# Patient Record
Sex: FEMALE | Race: WHITE | NOT HISPANIC OR LATINO
[De-identification: names, ages, dates, MRNs, and addresses within clinical notes are randomized per-mention and may not be internally consistent; named-entity substitution may affect disease eponyms.]

---

## 2021-06-22 ENCOUNTER — NON-APPOINTMENT (OUTPATIENT)
Age: 36
End: 2021-06-22

## 2021-06-22 PROBLEM — Z00.00 ENCOUNTER FOR PREVENTIVE HEALTH EXAMINATION: Status: ACTIVE | Noted: 2021-06-22

## 2021-06-23 ENCOUNTER — NON-APPOINTMENT (OUTPATIENT)
Age: 36
End: 2021-06-23

## 2021-06-23 ENCOUNTER — OUTPATIENT (OUTPATIENT)
Dept: OUTPATIENT SERVICES | Facility: HOSPITAL | Age: 36
LOS: 1 days | Discharge: HOME | End: 2021-06-23

## 2021-06-23 ENCOUNTER — ASOB RESULT (OUTPATIENT)
Age: 36
End: 2021-06-23

## 2021-06-23 ENCOUNTER — APPOINTMENT (OUTPATIENT)
Dept: ANTEPARTUM | Facility: CLINIC | Age: 36
End: 2021-06-23
Payer: MEDICAID

## 2021-06-23 ENCOUNTER — RESULT CHARGE (OUTPATIENT)
Age: 36
End: 2021-06-23

## 2021-06-23 ENCOUNTER — APPOINTMENT (OUTPATIENT)
Dept: OBGYN | Facility: CLINIC | Age: 36
End: 2021-06-23
Payer: MEDICAID

## 2021-06-23 VITALS
SYSTOLIC BLOOD PRESSURE: 90 MMHG | WEIGHT: 147 LBS | DIASTOLIC BLOOD PRESSURE: 60 MMHG | BODY MASS INDEX: 26.05 KG/M2 | HEIGHT: 62.99 IN

## 2021-06-23 LAB
BILIRUB UR QL STRIP: NEGATIVE
CLARITY UR: CLEAR
COLLECTION METHOD: NORMAL
GLUCOSE UR-MCNC: NEGATIVE
HCG UR QL: NEGATIVE EU/DL
HGB UR QL STRIP.AUTO: NEGATIVE
KETONES UR-MCNC: NEGATIVE
LEUKOCYTE ESTERASE UR QL STRIP: NEGATIVE
NITRITE UR QL STRIP: NEGATIVE
PH UR STRIP: 6
PROT UR STRIP-MCNC: NEGATIVE
SP GR UR STRIP: 1.02

## 2021-06-23 PROCEDURE — 99204 OFFICE O/P NEW MOD 45 MIN: CPT

## 2021-06-23 PROCEDURE — 76819 FETAL BIOPHYS PROFIL W/O NST: CPT | Mod: 26

## 2021-06-23 PROCEDURE — 76805 OB US >/= 14 WKS SNGL FETUS: CPT | Mod: 26

## 2021-06-24 DIAGNOSIS — O35.9XX1 MATERNAL CARE FOR (SUSPECTED) FETAL ABNORMALITY AND DAMAGE, UNSPECIFIED, FETUS 1: ICD-10-CM

## 2021-06-24 DIAGNOSIS — O09.30 SUPERVISION OF PREGNANCY WITH INSUFFICIENT ANTENATAL CARE, UNSPECIFIED TRIMESTER: ICD-10-CM

## 2021-06-24 DIAGNOSIS — Z36.89 ENCOUNTER FOR OTHER SPECIFIED ANTENATAL SCREENING: ICD-10-CM

## 2021-06-24 DIAGNOSIS — Z3A.35 35 WEEKS GESTATION OF PREGNANCY: ICD-10-CM

## 2021-06-24 DIAGNOSIS — Z36.3 ENCOUNTER FOR ANTENATAL SCREENING FOR MALFORMATIONS: ICD-10-CM

## 2021-06-24 LAB
C TRACH RRNA SPEC QL NAA+PROBE: NOT DETECTED
N GONORRHOEA RRNA SPEC QL NAA+PROBE: NOT DETECTED
SOURCE AMPLIFICATION: NORMAL

## 2021-06-25 DIAGNOSIS — O35.9XX1 MATERNAL CARE FOR (SUSPECTED) FETAL ABNORMALITY AND DAMAGE, UNSPECIFIED, FETUS 1: ICD-10-CM

## 2021-06-25 DIAGNOSIS — O09.30 SUPERVISION OF PREGNANCY WITH INSUFFICIENT ANTENATAL CARE, UNSPECIFIED TRIMESTER: ICD-10-CM

## 2021-06-25 DIAGNOSIS — Z36.89 ENCOUNTER FOR OTHER SPECIFIED ANTENATAL SCREENING: ICD-10-CM

## 2021-06-25 DIAGNOSIS — Z02.9 ENCOUNTER FOR ADMINISTRATIVE EXAMINATIONS, UNSPECIFIED: ICD-10-CM

## 2021-06-25 DIAGNOSIS — Z34.90 ENCOUNTER FOR SUPERVISION OF NORMAL PREGNANCY, UNSPECIFIED, UNSPECIFIED TRIMESTER: ICD-10-CM

## 2021-06-25 DIAGNOSIS — Z3A.35 35 WEEKS GESTATION OF PREGNANCY: ICD-10-CM

## 2021-06-25 DIAGNOSIS — Z36.3 ENCOUNTER FOR ANTENATAL SCREENING FOR MALFORMATIONS: ICD-10-CM

## 2021-06-25 LAB
ABO + RH PNL BLD: NORMAL
BASOPHILS # BLD AUTO: 0.04 K/UL
BASOPHILS NFR BLD AUTO: 0.4 %
BLD GP AB SCN SERPL QL: NORMAL
EOSINOPHIL # BLD AUTO: 0.17 K/UL
EOSINOPHIL NFR BLD AUTO: 1.7 %
GP B STREP DNA SPEC QL NAA+PROBE: NORMAL
GP B STREP DNA SPEC QL NAA+PROBE: NOT DETECTED
HBV SURFACE AG SER QL: NONREACTIVE
HCT VFR BLD CALC: 37.2 %
HGB A MFR BLD: 97.3 %
HGB A2 MFR BLD: 2.7 %
HGB BLD-MCNC: 12.3 G/DL
HGB FRACT BLD-IMP: NORMAL
HIV1+2 AB SPEC QL IA.RAPID: NONREACTIVE
IMM GRANULOCYTES NFR BLD AUTO: 1.3 %
LYMPHOCYTES # BLD AUTO: 1.44 K/UL
LYMPHOCYTES NFR BLD AUTO: 14.3 %
MAN DIFF?: NORMAL
MCHC RBC-ENTMCNC: 28.8 PG
MCHC RBC-ENTMCNC: 33.1 G/DL
MCV RBC AUTO: 87.1 FL
MEV IGG FLD QL IA: 19.5 AU/ML
MEV IGG+IGM SER-IMP: POSITIVE
MONOCYTES # BLD AUTO: 0.71 K/UL
MONOCYTES NFR BLD AUTO: 7 %
NEUTROPHILS # BLD AUTO: 7.6 K/UL
NEUTROPHILS NFR BLD AUTO: 75.3 %
PLATELET # BLD AUTO: 209 K/UL
RBC # BLD: 4.27 M/UL
RBC # FLD: 13.8 %
RUBV IGG FLD-ACNC: 18 INDEX
RUBV IGG SER-IMP: POSITIVE
SOURCE GBS: NORMAL
T PALLIDUM AB SER QL IA: NEGATIVE
WBC # FLD AUTO: 10.09 K/UL

## 2021-06-26 LAB
BACTERIA UR CULT: NORMAL
HPV HIGH+LOW RISK DNA PNL CVX: NOT DETECTED
LEAD BLD-MCNC: 1 UG/DL
VZV AB TITR SER: POSITIVE
VZV IGG SER IF-ACNC: 185.8 INDEX

## 2021-06-29 LAB
AR GENE MUT ANL BLD/T: NORMAL
M TB IFN-G BLD-IMP: NEGATIVE
QUANTIFERON TB PLUS MITOGEN MINUS NIL: 3.84 IU/ML
QUANTIFERON TB PLUS NIL: 0.02 IU/ML
QUANTIFERON TB PLUS TB1 MINUS NIL: 0 IU/ML
QUANTIFERON TB PLUS TB2 MINUS NIL: 0 IU/ML

## 2021-06-30 ENCOUNTER — NON-APPOINTMENT (OUTPATIENT)
Age: 36
End: 2021-06-30

## 2021-06-30 ENCOUNTER — APPOINTMENT (OUTPATIENT)
Dept: OBGYN | Facility: CLINIC | Age: 36
End: 2021-06-30
Payer: MEDICAID

## 2021-06-30 ENCOUNTER — OUTPATIENT (OUTPATIENT)
Dept: OUTPATIENT SERVICES | Facility: HOSPITAL | Age: 36
LOS: 1 days | Discharge: HOME | End: 2021-06-30

## 2021-06-30 ENCOUNTER — RESULT CHARGE (OUTPATIENT)
Age: 36
End: 2021-06-30

## 2021-06-30 VITALS — DIASTOLIC BLOOD PRESSURE: 70 MMHG | WEIGHT: 148 LBS | BODY MASS INDEX: 26.22 KG/M2 | SYSTOLIC BLOOD PRESSURE: 118 MMHG

## 2021-06-30 LAB
BILIRUB UR QL STRIP: NEGATIVE
CLARITY UR: CLEAR
COLLECTION METHOD: NORMAL
FMR1 GENE MUT ANL BLD/T: NORMAL
GLUCOSE UR-MCNC: NEGATIVE
HCG UR QL: NEGATIVE EU/DL
HGB UR QL STRIP.AUTO: NEGATIVE
KETONES UR-MCNC: NEGATIVE
LEUKOCYTE ESTERASE UR QL STRIP: NEGATIVE
NITRITE UR QL STRIP: NEGATIVE
PH UR STRIP: 6
PROT UR STRIP-MCNC: NEGATIVE
SP GR UR STRIP: 1.02

## 2021-06-30 PROCEDURE — 99213 OFFICE O/P EST LOW 20 MIN: CPT

## 2021-07-01 LAB — CFTR MUT TESTED BLD/T: NEGATIVE

## 2021-07-04 LAB — CYTOLOGY CVX/VAG DOC THIN PREP: ABNORMAL

## 2021-07-07 ENCOUNTER — NON-APPOINTMENT (OUTPATIENT)
Age: 36
End: 2021-07-07

## 2021-07-07 ENCOUNTER — APPOINTMENT (OUTPATIENT)
Dept: OBGYN | Facility: CLINIC | Age: 36
End: 2021-07-07
Payer: MEDICAID

## 2021-07-07 ENCOUNTER — OUTPATIENT (OUTPATIENT)
Dept: OUTPATIENT SERVICES | Facility: HOSPITAL | Age: 36
LOS: 1 days | Discharge: HOME | End: 2021-07-07

## 2021-07-07 VITALS — DIASTOLIC BLOOD PRESSURE: 70 MMHG | WEIGHT: 149 LBS | BODY MASS INDEX: 26.4 KG/M2 | SYSTOLIC BLOOD PRESSURE: 110 MMHG

## 2021-07-07 PROCEDURE — 99213 OFFICE O/P EST LOW 20 MIN: CPT

## 2021-07-13 ENCOUNTER — OUTPATIENT (OUTPATIENT)
Dept: OUTPATIENT SERVICES | Facility: HOSPITAL | Age: 36
LOS: 1 days | Discharge: HOME | End: 2021-07-13
Payer: MEDICAID

## 2021-07-13 VITALS
DIASTOLIC BLOOD PRESSURE: 71 MMHG | SYSTOLIC BLOOD PRESSURE: 107 MMHG | RESPIRATION RATE: 18 BRPM | HEART RATE: 95 BPM | TEMPERATURE: 98 F

## 2021-07-13 DIAGNOSIS — Z98.890 OTHER SPECIFIED POSTPROCEDURAL STATES: Chronic | ICD-10-CM

## 2021-07-13 DIAGNOSIS — Z98.891 HISTORY OF UTERINE SCAR FROM PREVIOUS SURGERY: Chronic | ICD-10-CM

## 2021-07-13 PROCEDURE — 59025 FETAL NON-STRESS TEST: CPT | Mod: 26

## 2021-07-13 PROCEDURE — 76815 OB US LIMITED FETUS(S): CPT | Mod: 26

## 2021-07-13 PROCEDURE — 99212 OFFICE O/P EST SF 10 MIN: CPT | Mod: 25

## 2021-07-13 NOTE — OB PROVIDER TRIAGE NOTE - NSHPLABSRESULTS_GEN_ALL_CORE
Date:  Blood type:  HBsAg:  Rubella:  Measles:  RPR:  Gonorrhea:  Chlamydia:  Varicella:  CF:  SMA:  Pap smear:     GCT:    GTT: Fastin hr:           2 hrs:           2 hrs:    Date:  HIV:  GBS:    ANEUPLOIDY SCREENING:    SONOGRAMS: 6/24/2021  Blood type: B+  HBsAg: Nonreactive  Rubella: Immune  Measles: Immune  RPR: Negative  Gonorrhea: Negative  Chlamydia: Negative  Varicella: Positive  CF: Negative  SMA: Negative  Pap smear: Negative    6/23/2021  HIV: Negative  GBS: Negative    SONOGRAMS:  6/23/21: EFW 2708 gms (42%), vtx, post placenta, CHEIKH 3.98 cm, BPP 8/8, no major fetal malformations noted

## 2021-07-13 NOTE — OB PROVIDER TRIAGE NOTE - HISTORY OF PRESENT ILLNESS
35y  @ 38.5 weeks by first trimester sonogram, THOMAS , presents for passing mucous plug. Patient has h/o prior C/S and is scheduled for repeat C/S. GBS negative. Denies VB, LOF, and ctx. +FM. No complications with this pregnancy.  35y  @ 38.5 weeks by first trimester sonogram, THOMAS , presents for passing mucous plug. Patient has h/o prior C/S with myomectomy and is scheduled for repeat C/S on 2021. GBS negative. Denies VB, LOF, and ctx. +FM. No complications with this pregnancy.

## 2021-07-13 NOTE — OB PROVIDER TRIAGE NOTE - NSOBPROVIDERNOTE_OBGYN_ALL_OB_FT
No 35y  @ 38.6 weeks, h.o c/s and myomectomy, GBS negative, not in labor.    -Discharged Home  -Labor precautions reviewed  -PO Hydration encouraged  -Advised to count fetal kick counts  -Follow up with your scheduled OB visit    Dr. Carbajal aware 35y  @ 38.6 weeks, h.o c/s and myomectomy, GBS negative, not in labor.    -Discharged Home  -Labor precautions reviewed  -PO Hydration encouraged  -Advised to count fetal kick counts  -Follow up with your scheduled OB visit    Dr. Carbajal aware  I was physically present for the key portions of the evaluation and management (E/M) service provided. I personally had a face to face encounter with the patient. I agree with the above history, physical and plan which I have reviewed and edited where appropriate.

## 2021-07-13 NOTE — OB PROVIDER TRIAGE NOTE - NSHPPHYSICALEXAM_GEN_ALL_CORE
T(C): 36.8 (07-13-21 @ 14:28), Max: 36.8 (07-13-21 @ 14:28)  HR: 95 (07-13-21 @ 14:28) (95 - 95)  BP: 107/71 (07-13-21 @ 14:28) (107/71 - 107/71)  RR: 18 (07-13-21 @ 14:28) (18 - 18)    EFM: bpm, moderate variability, +accels  TOCO: q mins    Abd: soft, gravid, nontender, no incisional tenderness T(C): 36.8 (07-13-21 @ 14:28), Max: 36.8 (07-13-21 @ 14:28)  HR: 95 (07-13-21 @ 14:28) (95 - 95)  BP: 107/71 (07-13-21 @ 14:28) (107/71 - 107/71)  RR: 18 (07-13-21 @ 14:28) (18 - 18)    EFM: 130 bpm, moderate variability, +accels  TOCO: irregular    Abd: soft, gravid, nontender, no incisional tenderness

## 2021-07-13 NOTE — OB PROVIDER TRIAGE NOTE - NS_OBGYNHISTORY_OBGYN_ALL_OB_FT
OB Hx: C/S x 1. Largest 3 kg                 G1 9/12/2018 FT C/S with myomectomy M 3 kg Neela No complications    Gyn Hx: fibroids  Denies cysts, fibroids, abnormal paps, and STIs.

## 2021-07-14 ENCOUNTER — RESULT CHARGE (OUTPATIENT)
Age: 36
End: 2021-07-14

## 2021-07-14 ENCOUNTER — NON-APPOINTMENT (OUTPATIENT)
Age: 36
End: 2021-07-14

## 2021-07-14 ENCOUNTER — APPOINTMENT (OUTPATIENT)
Dept: OBGYN | Facility: CLINIC | Age: 36
End: 2021-07-14
Payer: MEDICAID

## 2021-07-14 ENCOUNTER — OUTPATIENT (OUTPATIENT)
Dept: OUTPATIENT SERVICES | Facility: HOSPITAL | Age: 36
LOS: 1 days | Discharge: HOME | End: 2021-07-14

## 2021-07-14 VITALS
WEIGHT: 150 LBS | DIASTOLIC BLOOD PRESSURE: 60 MMHG | SYSTOLIC BLOOD PRESSURE: 100 MMHG | HEIGHT: 62.99 IN | BODY MASS INDEX: 26.58 KG/M2

## 2021-07-14 DIAGNOSIS — Z98.890 OTHER SPECIFIED POSTPROCEDURAL STATES: Chronic | ICD-10-CM

## 2021-07-14 DIAGNOSIS — Z98.891 HISTORY OF UTERINE SCAR FROM PREVIOUS SURGERY: Chronic | ICD-10-CM

## 2021-07-14 LAB
BILIRUB UR QL STRIP: NEGATIVE
CLARITY UR: CLEAR
COLLECTION METHOD: NORMAL
GLUCOSE UR-MCNC: NEGATIVE
HCG UR QL: NEGATIVE EU/DL
HGB UR QL STRIP.AUTO: NEGATIVE
KETONES UR-MCNC: NORMAL
LEUKOCYTE ESTERASE UR QL STRIP: NEGATIVE
NITRITE UR QL STRIP: NEGATIVE
PH UR STRIP: 7
PROT UR STRIP-MCNC: NEGATIVE
SP GR UR STRIP: 1.02

## 2021-07-14 PROCEDURE — 99213 OFFICE O/P EST LOW 20 MIN: CPT

## 2021-07-16 ENCOUNTER — LABORATORY RESULT (OUTPATIENT)
Age: 36
End: 2021-07-16

## 2021-07-17 ENCOUNTER — OUTPATIENT (OUTPATIENT)
Dept: OUTPATIENT SERVICES | Facility: HOSPITAL | Age: 36
LOS: 1 days | Discharge: HOME | End: 2021-07-17

## 2021-07-17 DIAGNOSIS — Z98.890 OTHER SPECIFIED POSTPROCEDURAL STATES: Chronic | ICD-10-CM

## 2021-07-17 DIAGNOSIS — Z11.59 ENCOUNTER FOR SCREENING FOR OTHER VIRAL DISEASES: ICD-10-CM

## 2021-07-17 DIAGNOSIS — Z98.891 HISTORY OF UTERINE SCAR FROM PREVIOUS SURGERY: Chronic | ICD-10-CM

## 2021-07-17 PROBLEM — Z78.9 OTHER SPECIFIED HEALTH STATUS: Chronic | Status: ACTIVE | Noted: 2021-07-13

## 2021-07-19 ENCOUNTER — NON-APPOINTMENT (OUTPATIENT)
Age: 36
End: 2021-07-19

## 2021-07-20 ENCOUNTER — INPATIENT (INPATIENT)
Facility: HOSPITAL | Age: 36
LOS: 2 days | Discharge: HOME | End: 2021-07-23
Attending: OBSTETRICS & GYNECOLOGY | Admitting: OBSTETRICS & GYNECOLOGY
Payer: MEDICAID

## 2021-07-20 VITALS
WEIGHT: 149.91 LBS | HEART RATE: 90 BPM | SYSTOLIC BLOOD PRESSURE: 104 MMHG | DIASTOLIC BLOOD PRESSURE: 53 MMHG | HEIGHT: 62.99 IN | RESPIRATION RATE: 16 BRPM | TEMPERATURE: 98 F

## 2021-07-20 DIAGNOSIS — Z98.891 HISTORY OF UTERINE SCAR FROM PREVIOUS SURGERY: Chronic | ICD-10-CM

## 2021-07-20 DIAGNOSIS — Z98.890 OTHER SPECIFIED POSTPROCEDURAL STATES: Chronic | ICD-10-CM

## 2021-07-20 LAB
AMPHET UR-MCNC: NEGATIVE — SIGNIFICANT CHANGE UP
APPEARANCE UR: ABNORMAL
BACTERIA # UR AUTO: NEGATIVE — SIGNIFICANT CHANGE UP
BARBITURATES UR SCN-MCNC: NEGATIVE — SIGNIFICANT CHANGE UP
BASOPHILS # BLD AUTO: 0.05 K/UL — SIGNIFICANT CHANGE UP (ref 0–0.2)
BASOPHILS NFR BLD AUTO: 0.4 % — SIGNIFICANT CHANGE UP (ref 0–1)
BENZODIAZ UR-MCNC: NEGATIVE — SIGNIFICANT CHANGE UP
BILIRUB UR-MCNC: NEGATIVE — SIGNIFICANT CHANGE UP
BLD GP AB SCN SERPL QL: SIGNIFICANT CHANGE UP
BUPRENORPHINE SCREEN, URINE RESULT: NEGATIVE — SIGNIFICANT CHANGE UP
COCAINE METAB.OTHER UR-MCNC: NEGATIVE — SIGNIFICANT CHANGE UP
COLOR SPEC: SIGNIFICANT CHANGE UP
DIFF PNL FLD: NEGATIVE — SIGNIFICANT CHANGE UP
EOSINOPHIL # BLD AUTO: 0.13 K/UL — SIGNIFICANT CHANGE UP (ref 0–0.7)
EOSINOPHIL NFR BLD AUTO: 1.1 % — SIGNIFICANT CHANGE UP (ref 0–8)
EPI CELLS # UR: 8 /HPF — HIGH (ref 0–5)
FENTANYL UR QL: NEGATIVE — SIGNIFICANT CHANGE UP
GLUCOSE UR QL: NEGATIVE — SIGNIFICANT CHANGE UP
HCT VFR BLD CALC: 37.7 % — SIGNIFICANT CHANGE UP (ref 37–47)
HGB BLD-MCNC: 12.8 G/DL — SIGNIFICANT CHANGE UP (ref 12–16)
HYALINE CASTS # UR AUTO: 2 /LPF — SIGNIFICANT CHANGE UP (ref 0–7)
IMM GRANULOCYTES NFR BLD AUTO: 1.8 % — HIGH (ref 0.1–0.3)
KETONES UR-MCNC: NEGATIVE — SIGNIFICANT CHANGE UP
L&D DRUG SCREEN, URINE: SIGNIFICANT CHANGE UP
LEUKOCYTE ESTERASE UR-ACNC: NEGATIVE — SIGNIFICANT CHANGE UP
LYMPHOCYTES # BLD AUTO: 1.53 K/UL — SIGNIFICANT CHANGE UP (ref 1.2–3.4)
LYMPHOCYTES # BLD AUTO: 12.9 % — LOW (ref 20.5–51.1)
MCHC RBC-ENTMCNC: 28.8 PG — SIGNIFICANT CHANGE UP (ref 27–31)
MCHC RBC-ENTMCNC: 34 G/DL — SIGNIFICANT CHANGE UP (ref 32–37)
MCV RBC AUTO: 84.9 FL — SIGNIFICANT CHANGE UP (ref 81–99)
METHADONE UR-MCNC: NEGATIVE — SIGNIFICANT CHANGE UP
MONOCYTES # BLD AUTO: 0.82 K/UL — HIGH (ref 0.1–0.6)
MONOCYTES NFR BLD AUTO: 6.9 % — SIGNIFICANT CHANGE UP (ref 1.7–9.3)
NEUTROPHILS # BLD AUTO: 9.16 K/UL — HIGH (ref 1.4–6.5)
NEUTROPHILS NFR BLD AUTO: 76.9 % — HIGH (ref 42.2–75.2)
NITRITE UR-MCNC: NEGATIVE — SIGNIFICANT CHANGE UP
NRBC # BLD: 0 /100 WBCS — SIGNIFICANT CHANGE UP (ref 0–0)
OPIATES UR-MCNC: NEGATIVE — SIGNIFICANT CHANGE UP
OXYCODONE UR-MCNC: NEGATIVE — SIGNIFICANT CHANGE UP
PCP UR-MCNC: NEGATIVE — SIGNIFICANT CHANGE UP
PH UR: 6.5 — SIGNIFICANT CHANGE UP (ref 5–8)
PLATELET # BLD AUTO: 186 K/UL — SIGNIFICANT CHANGE UP (ref 130–400)
PRENATAL SYPHILIS TEST: SIGNIFICANT CHANGE UP
PROPOXYPHENE QUALITATIVE URINE RESULT: NEGATIVE — SIGNIFICANT CHANGE UP
PROT UR-MCNC: NEGATIVE — SIGNIFICANT CHANGE UP
RBC # BLD: 4.44 M/UL — SIGNIFICANT CHANGE UP (ref 4.2–5.4)
RBC # FLD: 14.2 % — SIGNIFICANT CHANGE UP (ref 11.5–14.5)
RBC CASTS # UR COMP ASSIST: 0 /HPF — SIGNIFICANT CHANGE UP (ref 0–4)
SP GR SPEC: 1.01 — SIGNIFICANT CHANGE UP (ref 1.01–1.03)
UROBILINOGEN FLD QL: SIGNIFICANT CHANGE UP
WBC # BLD: 11.9 K/UL — HIGH (ref 4.8–10.8)
WBC # FLD AUTO: 11.9 K/UL — HIGH (ref 4.8–10.8)
WBC UR QL: 4 /HPF — SIGNIFICANT CHANGE UP (ref 0–5)

## 2021-07-20 PROCEDURE — 59514 CESAREAN DELIVERY ONLY: CPT | Mod: U9

## 2021-07-20 RX ORDER — CEFAZOLIN SODIUM 1 G
2000 VIAL (EA) INJECTION ONCE
Refills: 0 | Status: COMPLETED | OUTPATIENT
Start: 2021-07-20 | End: 2021-07-20

## 2021-07-20 RX ORDER — DIPHENHYDRAMINE HCL 50 MG
25 CAPSULE ORAL EVERY 6 HOURS
Refills: 0 | Status: DISCONTINUED | OUTPATIENT
Start: 2021-07-20 | End: 2021-07-23

## 2021-07-20 RX ORDER — IBUPROFEN 200 MG
600 TABLET ORAL EVERY 6 HOURS
Refills: 0 | Status: COMPLETED | OUTPATIENT
Start: 2021-07-20 | End: 2022-06-18

## 2021-07-20 RX ORDER — SIMETHICONE 80 MG/1
80 TABLET, CHEWABLE ORAL EVERY 4 HOURS
Refills: 0 | Status: DISCONTINUED | OUTPATIENT
Start: 2021-07-20 | End: 2021-07-23

## 2021-07-20 RX ORDER — CITRIC ACID/SODIUM CITRATE 300-500 MG
30 SOLUTION, ORAL ORAL ONCE
Refills: 0 | Status: DISCONTINUED | OUTPATIENT
Start: 2021-07-20 | End: 2021-07-20

## 2021-07-20 RX ORDER — SODIUM CHLORIDE 9 MG/ML
1000 INJECTION, SOLUTION INTRAVENOUS ONCE
Refills: 0 | Status: DISCONTINUED | OUTPATIENT
Start: 2021-07-20 | End: 2021-07-20

## 2021-07-20 RX ORDER — LANOLIN
1 OINTMENT (GRAM) TOPICAL EVERY 6 HOURS
Refills: 0 | Status: DISCONTINUED | OUTPATIENT
Start: 2021-07-20 | End: 2021-07-23

## 2021-07-20 RX ORDER — ENOXAPARIN SODIUM 100 MG/ML
40 INJECTION SUBCUTANEOUS DAILY
Refills: 0 | Status: DISCONTINUED | OUTPATIENT
Start: 2021-07-20 | End: 2021-07-23

## 2021-07-20 RX ORDER — SODIUM CHLORIDE 9 MG/ML
1000 INJECTION, SOLUTION INTRAVENOUS
Refills: 0 | Status: DISCONTINUED | OUTPATIENT
Start: 2021-07-20 | End: 2021-07-20

## 2021-07-20 RX ORDER — TETANUS TOXOID, REDUCED DIPHTHERIA TOXOID AND ACELLULAR PERTUSSIS VACCINE, ADSORBED 5; 2.5; 8; 8; 2.5 [IU]/.5ML; [IU]/.5ML; UG/.5ML; UG/.5ML; UG/.5ML
0.5 SUSPENSION INTRAMUSCULAR ONCE
Refills: 0 | Status: DISCONTINUED | OUTPATIENT
Start: 2021-07-20 | End: 2021-07-23

## 2021-07-20 RX ORDER — SODIUM CHLORIDE 9 MG/ML
500 INJECTION, SOLUTION INTRAVENOUS ONCE
Refills: 0 | Status: DISCONTINUED | OUTPATIENT
Start: 2021-07-20 | End: 2021-07-20

## 2021-07-20 RX ORDER — MAGNESIUM HYDROXIDE 400 MG/1
30 TABLET, CHEWABLE ORAL
Refills: 0 | Status: DISCONTINUED | OUTPATIENT
Start: 2021-07-20 | End: 2021-07-23

## 2021-07-20 RX ORDER — SODIUM CHLORIDE 9 MG/ML
1000 INJECTION, SOLUTION INTRAVENOUS
Refills: 0 | Status: DISCONTINUED | OUTPATIENT
Start: 2021-07-20 | End: 2021-07-23

## 2021-07-20 RX ORDER — ACETAMINOPHEN 500 MG
975 TABLET ORAL
Refills: 0 | Status: DISCONTINUED | OUTPATIENT
Start: 2021-07-20 | End: 2021-07-23

## 2021-07-20 RX ORDER — OXYCODONE HYDROCHLORIDE 5 MG/1
5 TABLET ORAL ONCE
Refills: 0 | Status: DISCONTINUED | OUTPATIENT
Start: 2021-07-20 | End: 2021-07-23

## 2021-07-20 RX ORDER — KETOROLAC TROMETHAMINE 30 MG/ML
30 SYRINGE (ML) INJECTION EVERY 6 HOURS
Refills: 0 | Status: DISCONTINUED | OUTPATIENT
Start: 2021-07-20 | End: 2021-07-21

## 2021-07-20 RX ORDER — FAMOTIDINE 10 MG/ML
20 INJECTION INTRAVENOUS ONCE
Refills: 0 | Status: COMPLETED | OUTPATIENT
Start: 2021-07-20 | End: 2021-07-20

## 2021-07-20 RX ORDER — OXYTOCIN 10 UNIT/ML
333.33 VIAL (ML) INJECTION
Qty: 20 | Refills: 0 | Status: DISCONTINUED | OUTPATIENT
Start: 2021-07-20 | End: 2021-07-20

## 2021-07-20 RX ORDER — METOCLOPRAMIDE HCL 10 MG
10 TABLET ORAL ONCE
Refills: 0 | Status: COMPLETED | OUTPATIENT
Start: 2021-07-20 | End: 2021-07-20

## 2021-07-20 RX ORDER — OXYCODONE HYDROCHLORIDE 5 MG/1
5 TABLET ORAL
Refills: 0 | Status: DISCONTINUED | OUTPATIENT
Start: 2021-07-20 | End: 2021-07-23

## 2021-07-20 RX ORDER — SENNA PLUS 8.6 MG/1
2 TABLET ORAL AT BEDTIME
Refills: 0 | Status: DISCONTINUED | OUTPATIENT
Start: 2021-07-20 | End: 2021-07-20

## 2021-07-20 RX ORDER — OXYTOCIN 10 UNIT/ML
333.33 VIAL (ML) INJECTION
Qty: 20 | Refills: 0 | Status: DISCONTINUED | OUTPATIENT
Start: 2021-07-20 | End: 2021-07-23

## 2021-07-20 RX ORDER — ONDANSETRON 8 MG/1
4 TABLET, FILM COATED ORAL EVERY 6 HOURS
Refills: 0 | Status: DISCONTINUED | OUTPATIENT
Start: 2021-07-20 | End: 2021-07-20

## 2021-07-20 RX ADMIN — Medication 10 MILLIGRAM(S): at 08:20

## 2021-07-20 RX ADMIN — FAMOTIDINE 20 MILLIGRAM(S): 10 INJECTION INTRAVENOUS at 08:52

## 2021-07-20 RX ADMIN — SIMETHICONE 80 MILLIGRAM(S): 80 TABLET, CHEWABLE ORAL at 18:53

## 2021-07-20 RX ADMIN — Medication 30 MILLIGRAM(S): at 23:45

## 2021-07-20 RX ADMIN — Medication 30 MILLIGRAM(S): at 13:08

## 2021-07-20 RX ADMIN — ENOXAPARIN SODIUM 40 MILLIGRAM(S): 100 INJECTION SUBCUTANEOUS at 22:13

## 2021-07-20 RX ADMIN — Medication 100 MILLIGRAM(S): at 08:55

## 2021-07-20 RX ADMIN — Medication 30 MILLIGRAM(S): at 23:14

## 2021-07-20 NOTE — PROGRESS NOTE ADULT - ASSESSMENT
35y now P2, s/p repeat LTCS POD0 EBL 800cc, h/o C/Sx1 with myomectomy, not yet ambulating or passing gas, mann in place, doing well    -Monitor vitals  -Monitor I/Os  -Pain management PRN  -Encourage ambulation  -Incentive spirometry  -IV fluids  -PO hydration  -Regular diet  -DVT ppx: Lovenox, SCDs    Dr Poe and Dr Carbajal to be made aware     35y now P2, s/p repeat LTCS POD0 EBL 800cc, h/o C/Sx1 with myomectomy, mann in place, doing well    -Monitor vitals  -Monitor I/Os  -Pain management PRN  -Encourage ambulation  -Incentive spirometry  -IV fluids  -PO hydration  -Regular diet  -DVT ppx: Lovenox, SCDs    Dr Poe and Dr Carbajal to be made aware     35y now P2, s/p repeat LTCS POD0 EBL 800cc, h/o C/Sx1 with myomectomy, doing well    -Monitor vitals  -Monitor I/Os  -Pain management PRN  -Encourage ambulation  -Incentive spirometry  -IV fluids  -PO hydration  -UO adequate, mann until AM  -Regular diet  -DVT ppx: Lovenox, SCDs  -f/u AM labs    Dr Poe and Dr Carbajal to be made aware     35y now P2, s/p repeat LTCS POD0 EBL 800cc, h/o C/Sx1 with myomectomy, doing well    -Monitor vitals  -Monitor I/Os  -Pain management PRN  -Encourage ambulation  -Incentive spirometry  -IV fluids  -PO hydration  -UO below adequate, ordered for 500cc bolus, mann until AM  -Regular diet  -DVT ppx: Lovenox, SCDs  -f/u AM labs    Dr Poe and Dr Carbajal to be made aware     35y now P2, s/p repeat LTCS POD0 EBL 800cc, h/o C/Sx1 with myomectomy, doing well    -Monitor vitals  -Monitor I/Os  -Pain management PRN  -Encourage ambulation  -Incentive spirometry  -IV fluids  -PO hydration  -UO [place] until PM  -Regular diet  -DVT ppx: Lovenox, SCDs  -f/u AM labs    Dr Poe and Dr Carbajal to be made aware

## 2021-07-20 NOTE — OB PROVIDER DELIVERY SUMMARY - NSSELHIDDEN_OBGYN_ALL_OB_FT
[NS_DeliveryAttending1_OBGYN_ALL_OB_FT:[id number]],[NS_DeliveryRN_OBGYN_ALL_OB_FT:[id number]],[NS_DeliveryAssist1_OBGYN_ALL_OB_FT:[id number]]

## 2021-07-20 NOTE — BRIEF OPERATIVE NOTE - OPERATION/FINDINGS
Extensive adhesions with bladder adherent to anterior uterine wall. Viable male infant delivered in vertex position, weighing 3230g, APGARs 9/9. Normal uterus, normal fallopian tubes bilaterally, normal ovaries bilaterally. Extensive adhesions with bladder adherent to anterior uterine wall was noted. Viable male infant delivered in vertex position, weighing 3230g, APGARs 9/9. Normal uterus, normal fallopian tubes bilaterally, normal ovaries bilaterally.

## 2021-07-20 NOTE — OB RN PATIENT PROFILE - THE IMPORTANCE OF INITIATING BREASTFEEDING WITHIN THE FIRST HOUR OF BIRTH.
Post Anesthetic Evaluation


Cardiovascular Status: Similar to Pre-Op Cond


Respiratory Status: Similar to Pre-op Cond.


Level of Consciousness/Mental Status: Can Participate in Eval


Pain Control: Adequate, Prn Tx Ordered


Nausea/Vomiting Control: Adequate, Prn Tx Ordered


Complications Possibly Related to Anesthesia: None Noted ([facility] pending.) Statement Selected

## 2021-07-20 NOTE — PROGRESS NOTE ADULT - SUBJECTIVE AND OBJECTIVE BOX
PGY1 NOTE  PM Round    Chief Complaint: Post  section    HPI: Pt doing well, pain well controlled.  She has not yet been ambulating or passing gas, mann catheter in place, she is tolerating regular diet without difficulty. She plans on breastfeeding but is bottle feeding in the meantime. Denies N/V, fevers, chills, CP, SOB, LE edema.    PAST MEDICAL & SURGICAL HISTORY:  History of  section  H/O myomectomy    Physical Exam  Vital Signs Last 24 Hrs  T(F): 97.6 (2021 15:07), Max: 98.24 (2021 12:11)  HR: 66 (2021 15:07) (55 - 90)  BP: 112/64 (2021 15:07) (93/55 - 112/64)  RR: 18 (2021 15:07) (16 - 18)    Physical exam:  General - AAOx3, NAD  Heart - S1S2, RRR  Lungs - CTA BL  Abdomen:  - Soft, nontender, mildly distended  - Fundus firm, appropriately tender, below the umbilicus  Incision - Surgical dressing and steri strips over pfannenstiel skin incision.  Pelvis/Vagina - Normal rubra lochia  Extremities - No calf tenderness, no swelling    Labs:                        12.8   11.90 )-----------( 186      ( 2021 06:55 )             37.7     Antibody Screen: NEG (21 @ 06:55)    Prenatal Syphilis Test: Nonreact (21 @ 06:55)    Urine Output: min 34cc/h  0243-8731: 75  9096-7043: 60    Medications:  acetaminophen   Tablet .. 975 milliGRAM(s) Oral <User Schedule>  diphenhydrAMINE 25 milliGRAM(s) Oral every 6 hours PRN  diphtheria/tetanus/pertussis (acellular) Vaccine (ADAcel) 0.5 milliLiter(s) IntraMuscular once  enoxaparin Injectable 40 milliGRAM(s) SubCutaneous daily  ibuprofen  Tablet. 600 milliGRAM(s) Oral every 6 hours  ketorolac   Injectable 30 milliGRAM(s) IV Push every 6 hours  lactated ringers. 1000 milliLiter(s) IV Continuous <Continuous>  lanolin Ointment 1 Application(s) Topical every 6 hours PRN  magnesium hydroxide Suspension 30 milliLiter(s) Oral two times a day PRN  oxyCODONE    IR 5 milliGRAM(s) Oral every 3 hours PRN  oxyCODONE    IR 5 milliGRAM(s) Oral once PRN  simethicone 80 milliGRAM(s) Chew every 4 hours       PGY1 NOTE  PM Round    Chief Complaint: Post  section    HPI: Pt doing well, pain well controlled.  She has not yet been ambulating or passing gas, mann catheter in place, she is tolerating regular diet without difficulty. She plans on breastfeeding but is bottle feeding in the meantime. Denies N/V, fevers, chills, CP, SOB, LE edema.    PAST MEDICAL & SURGICAL HISTORY:  History of  section  H/O myomectomy    Physical Exam  Vital Signs Last 24 Hrs  T(F): 97.6 (2021 15:07), Max: 98.24 (2021 12:11)  HR: 66 (2021 15:07) (55 - 90)  BP: 112/64 (2021 15:07) (93/55 - 112/64)  RR: 18 (2021 15:07) (16 - 18)    Physical exam:  General - AAOx3, NAD  Heart - S1S2, RRR  Lungs - CTA BL  Abdomen:  - Soft, nontender, mildly distended  - Fundus firm, appropriately tender, below the umbilicus  Incision - Surgical dressing and steri strips over pfannenstiel skin incision.  Pelvis/Vagina - Normal rubra lochia  Extremities - No calf tenderness, no swelling    Labs:                        12.8   11.90 )-----------( 186      ( 2021 06:55 )             37.7     Antibody Screen: NEG (21 @ 06:55)    Prenatal Syphilis Test: Nonreact (21 @ 06:55)    Urine Output: min 34cc/h  9544-7922: 75 (37.5/hr)  3860-4643: 60 (30/hr)    Medications:  acetaminophen   Tablet .. 975 milliGRAM(s) Oral <User Schedule>  diphenhydrAMINE 25 milliGRAM(s) Oral every 6 hours PRN  diphtheria/tetanus/pertussis (acellular) Vaccine (ADAcel) 0.5 milliLiter(s) IntraMuscular once  enoxaparin Injectable 40 milliGRAM(s) SubCutaneous daily  ibuprofen  Tablet. 600 milliGRAM(s) Oral every 6 hours  ketorolac   Injectable 30 milliGRAM(s) IV Push every 6 hours  lactated ringers. 1000 milliLiter(s) IV Continuous <Continuous>  lanolin Ointment 1 Application(s) Topical every 6 hours PRN  magnesium hydroxide Suspension 30 milliLiter(s) Oral two times a day PRN  oxyCODONE    IR 5 milliGRAM(s) Oral every 3 hours PRN  oxyCODONE    IR 5 milliGRAM(s) Oral once PRN  simethicone 80 milliGRAM(s) Chew every 4 hours       PGY1 NOTE  PM Round    Chief Complaint: Post  section    HPI: Pt doing well, pain well controlled.  She has not yet been ambulating or passing gas, mann catheter in place, she is tolerating regular diet without difficulty. She plans on breastfeeding but is bottle feeding in the meantime. Denies N/V, fevers, chills, CP, SOB, LE edema.    PAST MEDICAL & SURGICAL HISTORY:  History of  section  H/O myomectomy    Physical Exam.  Vital Signs Last 24 Hrs  T(F): 97.6 (2021 15:07), Max: 98.24 (2021 12:11)  HR: 66 (2021 15:07) (55 - 90)  BP: 112/64 (2021 15:07) (93/55 - 112/64)  RR: 18 (2021 15:07) (16 - 18)    Physical exam:  General - AAOx3, NAD  Heart - S1S2, RRR  Lungs - CTA BL  Abdomen:  - Soft, nontender, mildly distended  - Fundus firm, appropriately tender, below the umbilicus  Incision - Surgical dressing and steri strips over pfannenstiel skin incision.  Pelvis/Vagina - Normal rubra lochia  Extremities - No calf tenderness, no swelling    Labs:                        12.8   11.90 )-----------( 186      ( 2021 06:55 )             37.7     Antibody Screen: NEG (21 @ 06:55)    Prenatal Syphilis Test: Nonreact (21 @ 06:55)    Urine Output: min 34cc/h  9724-5821: 75 (37.5/hr)  8115-4554: 60 (30/hr)    Medications:  acetaminophen   Tablet .. 975 milliGRAM(s) Oral <User Schedule>  diphenhydrAMINE 25 milliGRAM(s) Oral every 6 hours PRN  diphtheria/tetanus/pertussis (acellular) Vaccine (ADAcel) 0.5 milliLiter(s) IntraMuscular once  enoxaparin Injectable 40 milliGRAM(s) SubCutaneous daily  ibuprofen  Tablet. 600 milliGRAM(s) Oral every 6 hours  ketorolac   Injectable 30 milliGRAM(s) IV Push every 6 hours  lactated ringers. 1000 milliLiter(s) IV Continuous <Continuous>  lanolin Ointment 1 Application(s) Topical every 6 hours PRN  magnesium hydroxide Suspension 30 milliLiter(s) Oral two times a day PRN  oxyCODONE    IR 5 milliGRAM(s) Oral every 3 hours PRN  oxyCODONE    IR 5 milliGRAM(s) Oral once PRN  simethicone 80 milliGRAM(s) Chew every 4 hours       PGY1 NOTE  PM Round    Chief Complaint: Post  section    HPI: Pt doing well, pain well controlled.  She has not yet been ambulating or passing gas, mann catheter in place, she is tolerating regular diet without difficulty. She plans on breastfeeding but is bottle feeding in the meantime. Denies N/V, fevers, chills, CP, SOB, LE edema.    PAST MEDICAL & SURGICAL HISTORY:  History of  section  H/O myomectomy    Physical Exam.  Vital Signs Last 24 Hrs  T(F): 97.6 (2021 15:07), Max: 98.24 (2021 12:11)  HR: 66 (2021 15:07) (55 - 90)  BP: 112/64 (2021 15:07) (93/55 - 112/64)  RR: 18 (2021 15:07) (16 - 18)    Urine Output: min 34cc/h  8667-8877: 75 (37.5/hr)  1793-3358: 60 (30/hr)    Physical exam:  General - AAOx3, NAD  Heart - S1S2, RRR  Lungs - CTA BL  Abdomen:  - Soft, nontender, mildly distended  - Fundus firm, appropriately tender, below the umbilicus  Incision - Surgical dressing and steri strips over pfannenstiel skin incision.  Pelvis/Vagina - Normal rubra lochia  Extremities - No calf tenderness, no swelling    Labs:                        12.8   11.90 )-----------( 186      ( 2021 06:55 )             37.7     Antibody Screen: NEG (21 @ 06:55)    Prenatal Syphilis Test: Nonreact (21 @ 06:55)        Medications:  acetaminophen   Tablet .. 975 milliGRAM(s) Oral <User Schedule>  diphenhydrAMINE 25 milliGRAM(s) Oral every 6 hours PRN  diphtheria/tetanus/pertussis (acellular) Vaccine (ADAcel) 0.5 milliLiter(s) IntraMuscular once  enoxaparin Injectable 40 milliGRAM(s) SubCutaneous daily  ibuprofen  Tablet. 600 milliGRAM(s) Oral every 6 hours  ketorolac   Injectable 30 milliGRAM(s) IV Push every 6 hours  lactated ringers. 1000 milliLiter(s) IV Continuous <Continuous>  lanolin Ointment 1 Application(s) Topical every 6 hours PRN  magnesium hydroxide Suspension 30 milliLiter(s) Oral two times a day PRN  oxyCODONE    IR 5 milliGRAM(s) Oral every 3 hours PRN  oxyCODONE    IR 5 milliGRAM(s) Oral once PRN  simethicone 80 milliGRAM(s) Chew every 4 hours       PGY1 NOTE  PM Round    Chief Complaint: Post  section    HPI: Pt doing well, pain well controlled.  She has not yet been ambulating or passing gas, mann catheter in place, she is tolerating regular diet without difficulty. She plans on breastfeeding but is bottle feeding in the meantime. Denies N/V, fevers, chills, CP, SOB, LE edema.    PAST MEDICAL & SURGICAL HISTORY:  History of  section  H/O myomectomy    Physical Exam.  Vital Signs Last 24 Hrs  T(F): 97.6 (2021 15:07), Max: 98.24 (2021 12:11)  HR: 66 (2021 15:07) (55 - 90)  BP: 112/64 (2021 15:07) (93/55 - 112/64)  RR: 18 (2021 15:07) (16 - 18)    Urine Output: min 34cc/h  5084-3975: 75 (37.5/hr)  8329-9479: 350 (100/hr)    Physical exam:  General - AAOx3, NAD  Heart - S1S2, RRR  Lungs - CTA BL  Abdomen:  - Soft, nontender, mildly distended  - Fundus firm, appropriately tender, below the umbilicus  Incision - Surgical dressing and steri strips over pfannenstiel skin incision.  Pelvis/Vagina - Normal rubra lochia  Extremities - No calf tenderness, no swelling    Labs:                        12.8   11.90 )-----------( 186      ( 2021 06:55 )             37.7     Antibody Screen: NEG (21 @ 06:55)    Prenatal Syphilis Test: Nonreact (21 @ 06:55)        Medications:  acetaminophen   Tablet .. 975 milliGRAM(s) Oral <User Schedule>  diphenhydrAMINE 25 milliGRAM(s) Oral every 6 hours PRN  diphtheria/tetanus/pertussis (acellular) Vaccine (ADAcel) 0.5 milliLiter(s) IntraMuscular once  enoxaparin Injectable 40 milliGRAM(s) SubCutaneous daily  ibuprofen  Tablet. 600 milliGRAM(s) Oral every 6 hours  ketorolac   Injectable 30 milliGRAM(s) IV Push every 6 hours  lactated ringers. 1000 milliLiter(s) IV Continuous <Continuous>  lanolin Ointment 1 Application(s) Topical every 6 hours PRN  magnesium hydroxide Suspension 30 milliLiter(s) Oral two times a day PRN  oxyCODONE    IR 5 milliGRAM(s) Oral every 3 hours PRN  oxyCODONE    IR 5 milliGRAM(s) Oral once PRN  simethicone 80 milliGRAM(s) Chew every 4 hours

## 2021-07-20 NOTE — OB RN PREOPERATIVE CHECKLIST - NOTHING BY MOUTH SINCE
I have reviewed and confirmed nurses' notes regarding past medical, surgical, and family history. 19-Jul-2021 21:00

## 2021-07-20 NOTE — OB PROVIDER H&P - HISTORY OF PRESENT ILLNESS
35y  @ 39.5 weeks by first trimester sonogram, THOMAS 2021, presents for scheduled repeat C/S. Patient has h/o prior C/S x 1 with myomectomy. Denies VB, LOF, and ctx. +FM. No complications during this pregnancy.

## 2021-07-20 NOTE — OB PROVIDER H&P - NSHPLABSRESULTS_GEN_ALL_CORE
SONOGRAMS:  6/23/21: EFW 2708 gms (42%), vtx, post placenta, CHEIKH 3.98 cm, BPP 8/8, no major fetal malformations noted

## 2021-07-20 NOTE — BRIEF OPERATIVE NOTE - ESTIMATED BLOOD LOSS
Subjective   Milo Masters is a 23 y.o. female  Sore Throat (sore throat x3 day) and Generalized Body Aches (mild body aches )      History of Present Illness  Patient comes in today states that she has had a sore throat mild body aches, diarrhea and slight cough for the past 3 days.  The following portions of the patient's history were reviewed and updated as appropriate: allergies, current medications, past social history and problem list    Review of Systems   Constitutional: Negative for fatigue and fever.   HENT: Positive for sore throat. Negative for trouble swallowing and voice change.    Respiratory: Positive for cough. Negative for apnea and shortness of breath.    Gastrointestinal: Positive for diarrhea.   Musculoskeletal: Positive for myalgias.   Skin: Negative.    Neurological: Positive for headaches.       Objective     Vitals:    02/28/18 0843   BP: 128/84   Pulse: 86   Temp: 98.7 °F (37.1 °C)   SpO2: 99%       Physical Exam   Constitutional: She appears well-developed and well-nourished. No distress.   HENT:   Head: Normocephalic and atraumatic.   Right Ear: External ear normal.   Left Ear: External ear normal.   Nose: Nose normal.   Mouth/Throat: Oropharynx is clear and moist. No oropharyngeal exudate.   Eyes: Conjunctivae are normal. Right eye exhibits no discharge. Left eye exhibits no discharge.   Neck: Normal range of motion. Neck supple.   Cardiovascular: Normal rate, regular rhythm and normal heart sounds.    Pulmonary/Chest: Effort normal and breath sounds normal. No respiratory distress.   Lymphadenopathy:     She has no cervical adenopathy.   Skin: Skin is warm and dry. She is not diaphoretic.   Nursing note and vitals reviewed.   rapid flu test positive for type A flu.  Discussed with patient and her grandmother.    Assessment/Plan     Diagnoses and all orders for this visit:    Body aches  -     POCT Influenza A/B  -     oseltamivir (TAMIFLU) 75 MG capsule; Take 1 capsule by mouth 2 (Two)  Times a Day.    Sore throat  -     POCT rapid strep A  -     oseltamivir (TAMIFLU) 75 MG capsule; Take 1 capsule by mouth 2 (Two) Times a Day.       800

## 2021-07-20 NOTE — OB RN PATIENT PROFILE - PRETERM DELIVERIES, OB PROFILE
"[facility]  GASTROENTEROLOGY PROGRESS NOTE  Prince Agarwal [id number]   03/02/2016      - Continues to c/o abdominal pain with liquid diet.   - Not able to tolerate clears.   - overnight spiked a temp of 101.2      OBJECTIVE:  VS: /62 (BP Location: Right arm)  Pulse 87  Temp 99.3  F (37.4  C) (Oral)  Resp 16  Ht 1.753 m (5' 9\")  Wt 114.9 kg (253 lb 3.2 oz)  SpO2 95%  BMI 37.39 kg/m2   GEN: A&Ox3, NAD, comfortable  CV:  RRR, no M/G/R  PULM:  CTA B/L  ABD: Normoactive bowel sounds, soft, ND, NT, no HSM      REVIEW OF LABORATORY, PATHOLOGY AND IMAGING RESULTS:  BMP  Recent Labs  Lab 03/01/17  0813 02/27/17  1226    134   POTASSIUM 4.0 4.0   CHLORIDE 102 100   HERMES 8.6 8.9   CO2 27 27   BUN 14 22   CR 1.13 1.20   * 235*       CBC  Recent Labs  Lab 02/28/17  0745 02/27/17  1226   WBC 9.0 7.7   RBC 3.92* 4.39*   HGB 11.5* 12.8*   HCT 33.5* 36.4*   MCV 86 83   MCH 29.3 29.2   MCHC 34.3 35.2   RDW 12.8 12.6    213       INR  Recent Labs  Lab 02/27/17  1226   INR 1.07       LFTs  Recent Labs  Lab 03/01/17  0813 02/27/17  1226   ALKPHOS 57 59   AST 22 15   ALT 46 47   BILITOTAL 2.6* 0.6   PROTTOTAL 6.7* 7.9   ALBUMIN 3.2* 4.2        PANC  Recent Labs  Lab 03/02/17  0836 03/01/17  0813 02/28/17  0745 02/27/17  1913 02/27/17  1226   LIPASE 612* 1232* 3585* 2000* 353   AMYLASE  --  183* 249* 141* 62       IMPRESSION:  Prince Agarwal is a 61 year old male admitted after a complex ERCP done for high grade stricture in the PD, with pain and elevated lipase, continues to c/o pain though lipase has been down trending.       RECOMMENDATIONS:  - Continue aggressive LR infusion will need to be closely monitored as he is fluid pills as well so will need monitoring for fluid overload.  - Pain and nausea control.   - Advance diet to clears.  - Since patient underwent pancreatic manipulation and has not seen any improvement in pain will recommend CT abdomen with contrast to evaluate for pancreatic abscess or " duct leak.   - Rest of the care as per the primary team.          It has been a pleasure to participate in the care of this patient.  Patient was discussed with GI staff, .  Please feel free to page with questions.     Chandrika Kelly MD  Therapeutic Endoscopy Fellow  [phone]     0

## 2021-07-20 NOTE — OB RN DELIVERY SUMMARY - NSSELHIDDEN_OBGYN_ALL_OB_FT
[NS_DeliveryAttending1_OBGYN_ALL_OB_FT:[id number]],[NS_DeliveryRN_OBGYN_ALL_OB_FT:[id number]] [NS_DeliveryAttending1_OBGYN_ALL_OB_FT:[id number]],[NS_DeliveryRN_OBGYN_ALL_OB_FT:[id number]],[NS_DeliveryAssist1_OBGYN_ALL_OB_FT:[id number]]

## 2021-07-20 NOTE — BRIEF OPERATIVE NOTE - NSICDXBRIEFPREOP_GEN_ALL_CORE_FT
PRE-OP DIAGNOSIS:  H/O  section 2021 11:13:28  Jennifer Luna  H/O myomectomy 2021 11:13:43 at time of first  section Jennifer Luna  39 weeks gestation of pregnancy 2021 11:14:42 39w5d Jennifer Luna

## 2021-07-20 NOTE — BRIEF OPERATIVE NOTE - NSICDXBRIEFPOSTOP_GEN_ALL_CORE_FT
POST-OP DIAGNOSIS:  Status post repeat low transverse  section 2021 11:14:02  Jennifer Luna  39 weeks gestation of pregnancy 2021 11:14:26 39w5d   Jennifer Luna  H/O myomectomy 2021 11:15:04 at time of first  section Jennifer Luna   POST-OP DIAGNOSIS:  Status post repeat low transverse  section 2021 11:14:02  Jennifer Luna  39 weeks gestation of pregnancy 2021 11:14:26 39w5d   Jennifer Luna  H/O myomectomy 2021 11:15:04 at time of first  section Jennifer Luna  H/O  section 2021 19:15:25  Jennifer Luna

## 2021-07-20 NOTE — OB RN INTRAOPERATIVE NOTE - NSSELHIDDEN_OBGYN_ALL_OB_FT
[NS_DeliveryAttending1_OBGYN_ALL_OB_FT:[id number]],[NS_DeliveryRN_OBGYN_ALL_OB_FT:[id number]] [NS_DeliveryAttending1_OBGYN_ALL_OB_FT:[id number]=],[NS_DeliveryRN_OBGYN_ALL_OB_FT:[id number]],[NS_DeliveryAssist1_OBGYN_ALL_OB_FT:[id number]=]

## 2021-07-20 NOTE — CHART NOTE - NSCHARTNOTEFT_GEN_A_CORE
PACU ANESTHESIA ADMISSION NOTE    Procedure:   Post op diagnosis:      ____  Intubated  TV:______       Rate: ______      FiO2: ______  x____  Patent Airway  x____  Full return of protective reflexes  x____  Full recovery from anesthesia / back to baseline     Vitals:       Sat:   98                  BP:        100/60            R:          18  P: 64  T:     98        Mental Status:    x____ Awake    ____ Alert     ____ Drowsy    ____ Sedated    Nausea/Vomiting:   _x___ NO    ____ Yes,   See Post - Op Orders          Pain Scale (0-10):    ____ Treatment:   x____ None      ____ See Post - Op/PCA Orders    Post - Operative Fluids:    _x___ Oral     ____ See Post - Op Orders    Plan: Discharge:     ____Home         _x____Floor       _____Critical Care      _____  Other:_________________    Comments:
Oriented - self; Oriented - place; Oriented - time

## 2021-07-20 NOTE — OB PROVIDER H&P - ASSESSMENT
35y  @ 39.5 weeks, h/o prior c/s x 1 and mymoectomy, scheduled rpt c/s x 2.    Plan:  Admit to L&D  IVF  NPO diet  Continuous TOCO/EFM  Ancef prior to OR  Abdominal prep  SCDs B/L  Pain Management PRN    Dr. Carbajal aware

## 2021-07-20 NOTE — OB RN PATIENT PROFILE - POST PARTUM DEPRESSION SCREEN OB 3
metformin (GLUCOPHAGE) 1000 MG tablet  Last Seen: 11/29/2017  Next Appt: 5/11/2018  Last refill: 12/13/2017 # dispensed 180 # refills 1  Last Lab/s: 11/1/2017  Script set up to erescribe to pharmacy upon physicians approval.    Script eprescribed to pharmacy per verbal order of MD
no

## 2021-07-21 ENCOUNTER — APPOINTMENT (OUTPATIENT)
Dept: OBGYN | Facility: CLINIC | Age: 36
End: 2021-07-21

## 2021-07-21 LAB
BASOPHILS # BLD AUTO: 0.03 K/UL — SIGNIFICANT CHANGE UP (ref 0–0.2)
BASOPHILS NFR BLD AUTO: 0.2 % — SIGNIFICANT CHANGE UP (ref 0–1)
EOSINOPHIL # BLD AUTO: 0.02 K/UL — SIGNIFICANT CHANGE UP (ref 0–0.7)
EOSINOPHIL NFR BLD AUTO: 0.1 % — SIGNIFICANT CHANGE UP (ref 0–8)
HCT VFR BLD CALC: 31 % — LOW (ref 37–47)
HGB BLD-MCNC: 10.6 G/DL — LOW (ref 12–16)
IMM GRANULOCYTES NFR BLD AUTO: 0.7 % — HIGH (ref 0.1–0.3)
LYMPHOCYTES # BLD AUTO: 1.01 K/UL — LOW (ref 1.2–3.4)
LYMPHOCYTES # BLD AUTO: 5.7 % — LOW (ref 20.5–51.1)
MCHC RBC-ENTMCNC: 29.4 PG — SIGNIFICANT CHANGE UP (ref 27–31)
MCHC RBC-ENTMCNC: 34.2 G/DL — SIGNIFICANT CHANGE UP (ref 32–37)
MCV RBC AUTO: 85.9 FL — SIGNIFICANT CHANGE UP (ref 81–99)
MONOCYTES # BLD AUTO: 0.7 K/UL — HIGH (ref 0.1–0.6)
MONOCYTES NFR BLD AUTO: 4 % — SIGNIFICANT CHANGE UP (ref 1.7–9.3)
NEUTROPHILS # BLD AUTO: 15.77 K/UL — HIGH (ref 1.4–6.5)
NEUTROPHILS NFR BLD AUTO: 89.3 % — HIGH (ref 42.2–75.2)
NRBC # BLD: 0 /100 WBCS — SIGNIFICANT CHANGE UP (ref 0–0)
PLATELET # BLD AUTO: 170 K/UL — SIGNIFICANT CHANGE UP (ref 130–400)
RBC # BLD: 3.61 M/UL — LOW (ref 4.2–5.4)
RBC # FLD: 14.3 % — SIGNIFICANT CHANGE UP (ref 11.5–14.5)
WBC # BLD: 17.66 K/UL — HIGH (ref 4.8–10.8)
WBC # FLD AUTO: 17.66 K/UL — HIGH (ref 4.8–10.8)

## 2021-07-21 PROCEDURE — 99231 SBSQ HOSP IP/OBS SF/LOW 25: CPT

## 2021-07-21 RX ORDER — IBUPROFEN 200 MG
600 TABLET ORAL EVERY 6 HOURS
Refills: 0 | Status: DISCONTINUED | OUTPATIENT
Start: 2021-07-21 | End: 2021-07-23

## 2021-07-21 RX ADMIN — Medication 30 MILLIGRAM(S): at 06:05

## 2021-07-21 RX ADMIN — SIMETHICONE 80 MILLIGRAM(S): 80 TABLET, CHEWABLE ORAL at 06:06

## 2021-07-21 RX ADMIN — Medication 975 MILLIGRAM(S): at 16:35

## 2021-07-21 RX ADMIN — Medication 30 MILLIGRAM(S): at 06:27

## 2021-07-21 RX ADMIN — Medication 975 MILLIGRAM(S): at 08:30

## 2021-07-21 RX ADMIN — SIMETHICONE 80 MILLIGRAM(S): 80 TABLET, CHEWABLE ORAL at 16:04

## 2021-07-21 RX ADMIN — Medication 600 MILLIGRAM(S): at 11:22

## 2021-07-21 RX ADMIN — Medication 975 MILLIGRAM(S): at 09:00

## 2021-07-21 RX ADMIN — SIMETHICONE 80 MILLIGRAM(S): 80 TABLET, CHEWABLE ORAL at 10:45

## 2021-07-21 RX ADMIN — Medication 600 MILLIGRAM(S): at 11:50

## 2021-07-21 RX ADMIN — SIMETHICONE 80 MILLIGRAM(S): 80 TABLET, CHEWABLE ORAL at 18:55

## 2021-07-21 RX ADMIN — Medication 600 MILLIGRAM(S): at 19:20

## 2021-07-21 RX ADMIN — ENOXAPARIN SODIUM 40 MILLIGRAM(S): 100 INJECTION SUBCUTANEOUS at 11:22

## 2021-07-21 RX ADMIN — Medication 975 MILLIGRAM(S): at 16:05

## 2021-07-21 RX ADMIN — Medication 600 MILLIGRAM(S): at 18:55

## 2021-07-21 NOTE — PROGRESS NOTE ADULT - ASSESSMENT
35y now P2, s/p repeat LTCS POD1, EBL 800cc, h/o C/Sx1 with myomectomy, doing well    -Monitor vitals  -Monitor I/Os  -Pain management PRN  -Encourage ambulation  -Incentive spirometry  -IV fluids  -PO hydration  -TOV 1300  -Regular diet  -DVT ppx: Lovenox, SCDs  -f/u AM labs    Dr Carbajal to be made aware

## 2021-07-21 NOTE — MEDICAL STUDENT PROGRESS NOTE(EDUCATION) - SUBJECTIVE AND OBJECTIVE BOX
36 yo  is doing well today with no acute complaints. Pain in general abdomen is well controlled. She admits to nausea and lack of appetite with episodes of vomiting.  She denies fever, fatigue, dizziness, severe pain, vaginal bleeding, or dysuria. She has not past flatus and mann is still in place. Patient was breastfeeding and bonding with baby well.     Past medical history:  none    Past surgical history:  - myomectomy  -  section    Physical Exam  Vital Signs Last 24 Hrs  T(F): 98.4 (2021 03:53), Max: 98.4 (2021 23:22)  HR: 92 (2021 03:53) (55 - 92)  BP: 119/56 (2021 03:53) (93/55 - 119/56)  RR: 18 (2021 03:53) (16 - 20)    Heart: S1S2  Lungs: clear  Abd: Soft, nontender, mild pain on palpation  Incision: Dressing removed Steri strips in place, incision   Fundus: Firm, appropriately tender, below the umbilicus  Lochia: Normal  Ext: No calf tenderness or swelling, SCDs in place    MEDICATIONS  (STANDING):  acetaminophen   Tablet .. 975 milliGRAM(s) Oral <User Schedule>  diphtheria/tetanus/pertussis (acellular) Vaccine (ADAcel) 0.5 milliLiter(s) IntraMuscular once  enoxaparin Injectable 40 milliGRAM(s) SubCutaneous daily  ibuprofen  Tablet. 600 milliGRAM(s) Oral every 6 hours  lactated ringers. 1000 milliLiter(s) (125 mL/Hr) IV Continuous <Continuous>  oxytocin Infusion 333.333 milliUNIT(s)/Min (1000 mL/Hr) IV Continuous <Continuous>  simethicone 80 milliGRAM(s) Chew every 4 hour

## 2021-07-21 NOTE — PROGRESS NOTE ADULT - SUBJECTIVE AND OBJECTIVE BOX
PGY 3 Note:    Pt doing well, pain well controlled. No acute complaints. Denies fever, chills, N/V, CP, SOB, severe abdominal pain or heavy vaginal bleeding, dysuria. Patient is ambulating from bed to chair, mann catheter in place, has not passed flatus yet, and is breastfeeding without difficulty.  Patient has been intermittently nauseous, has not tolerated regular diet yet.     PAST MEDICAL & SURGICAL HISTORY:  No pertinent past medical history    History of  section    H/O myomectomy        Physical Exam  Vital Signs Last 24 Hrs  T(F): 98.4 (2021 03:53), Max: 98.4 (2021 23:22)  HR: 92 (2021 03:53) (55 - 92)  BP: 119/56 (2021 03:53) (93/55 - 119/56)  RR: 18 (2021 03:53) (16 - 20)    Gen: AAOx3, NAD  Heart: S1S2, RRR  Lungs: CTABL  Abd: Soft, nontender, mildly distended, BS+  Incision: Dressing removed, moderate saturation. Steri strips in place, incision c/d/i, no erythema/induration/drainage.  Fundus: Firm, appropriately tender, below the umbilicus  Lochia: Normal  Ext: No calf tenderness, no swelling, SCDs in place    Labs:                        12.8   11.90 )-----------( 186      ( 2021 06:55 )             37.7     MEDICATIONS  (STANDING):  acetaminophen   Tablet .. 975 milliGRAM(s) Oral <User Schedule>  diphtheria/tetanus/pertussis (acellular) Vaccine (ADAcel) 0.5 milliLiter(s) IntraMuscular once  enoxaparin Injectable 40 milliGRAM(s) SubCutaneous daily  ibuprofen  Tablet. 600 milliGRAM(s) Oral every 6 hours  lactated ringers. 1000 milliLiter(s) (125 mL/Hr) IV Continuous <Continuous>  oxytocin Infusion 333.333 milliUNIT(s)/Min (1000 mL/Hr) IV Continuous <Continuous>  simethicone 80 milliGRAM(s) Chew every 4 hours

## 2021-07-21 NOTE — MEDICAL STUDENT PROGRESS NOTE(EDUCATION) - NS MD HP STUD ASPLAN PLAN FT
- monitor vitals  - manage pain  - regular diet  - trial of void by 1300  - iv fluids  - encourage ambulation  - patient wants circumcision  - informed to follow up at clinic in 1 week

## 2021-07-22 ENCOUNTER — TRANSCRIPTION ENCOUNTER (OUTPATIENT)
Age: 36
End: 2021-07-22

## 2021-07-22 LAB
COVID-19 SPIKE DOMAIN AB INTERP: POSITIVE
COVID-19 SPIKE DOMAIN ANTIBODY RESULT: 30.4 U/ML — HIGH
SARS-COV-2 IGG+IGM SERPL QL IA: 30.4 U/ML — HIGH
SARS-COV-2 IGG+IGM SERPL QL IA: POSITIVE

## 2021-07-22 PROCEDURE — 99231 SBSQ HOSP IP/OBS SF/LOW 25: CPT

## 2021-07-22 RX ORDER — IBUPROFEN 200 MG
1 TABLET ORAL
Qty: 0 | Refills: 0 | DISCHARGE
Start: 2021-07-22

## 2021-07-22 RX ORDER — ACETAMINOPHEN 500 MG
3 TABLET ORAL
Qty: 0 | Refills: 0 | DISCHARGE
Start: 2021-07-22

## 2021-07-22 RX ADMIN — Medication 975 MILLIGRAM(S): at 16:21

## 2021-07-22 RX ADMIN — SIMETHICONE 80 MILLIGRAM(S): 80 TABLET, CHEWABLE ORAL at 06:17

## 2021-07-22 RX ADMIN — Medication 975 MILLIGRAM(S): at 18:35

## 2021-07-22 RX ADMIN — Medication 600 MILLIGRAM(S): at 06:17

## 2021-07-22 RX ADMIN — Medication 975 MILLIGRAM(S): at 22:30

## 2021-07-22 RX ADMIN — Medication 975 MILLIGRAM(S): at 21:53

## 2021-07-22 RX ADMIN — Medication 600 MILLIGRAM(S): at 07:27

## 2021-07-22 RX ADMIN — SIMETHICONE 80 MILLIGRAM(S): 80 TABLET, CHEWABLE ORAL at 21:53

## 2021-07-22 RX ADMIN — ENOXAPARIN SODIUM 40 MILLIGRAM(S): 100 INJECTION SUBCUTANEOUS at 16:17

## 2021-07-22 RX ADMIN — Medication 600 MILLIGRAM(S): at 18:36

## 2021-07-22 RX ADMIN — SIMETHICONE 80 MILLIGRAM(S): 80 TABLET, CHEWABLE ORAL at 00:21

## 2021-07-22 NOTE — DISCHARGE NOTE OB - CARE PLAN
Principal Discharge DX:	 delivery delivered  Goal:	healthy mother and baby  Assessment and plan of treatment:	vitals and labs

## 2021-07-22 NOTE — PROGRESS NOTE ADULT - SUBJECTIVE AND OBJECTIVE BOX
AUDREY BANERJEE  35y  Female  CC: Postpartum  PGY2 Note:  Patient seen and examined bedside. No overnight events. Denies heavy vaginal bleeding and reports pain well controlled. Ambulating and voiding without difficulty. Tolerating regular diet. Reports +flatus. Denies dizziness, lightheadedness, SOB, or palpitations. Denies fever, chills, nausea, vomiting. Breastfeeding.     PAST MEDICAL & SURGICAL HISTORY:  No pertinent past medical history    History of  section    H/O myomectomy        Physical Exam  Vital Signs Last 24 Hrs  T(C): 36 (2021 04:13), Max: 37.1 (2021 20:21)  T(F): 96.8 (2021 04:13), Max: 98.8 (2021 20:21)  HR: 80 (2021 04:13) (80 - 100)  BP: 98/52 (2021 04:13) (90/51 - 124/57)  RR: 18 (2021 04:13) (18 - 18)    Gen: AAOx3, NAD  CV: RRR. No murmors gallops or rubs.  Pulm: CTAB. No wheezes or rales.  Ext: No calf tenderness, no swelling.   Abd: Soft, nontender, nondistended, BS+  Fundus firm, and below umbilicus. Nontender.   Lochia: Minimal, rubra  Wound: Pfannenstiel incision clean, dry intact. Steris in place. No surrounding edema or erythema.       Labs:                        10.6   17.66 )-----------( 170      ( 2021 06:06 )             31.0                         12.8   11.90 )-----------( 186      ( 2021 06:55 )             37.7        MEDICATIONS  (STANDING):  acetaminophen   Tablet .. 975 milliGRAM(s) Oral <User Schedule>  diphtheria/tetanus/pertussis (acellular) Vaccine (ADAcel) 0.5 milliLiter(s) IntraMuscular once  enoxaparin Injectable 40 milliGRAM(s) SubCutaneous daily  ibuprofen  Tablet. 600 milliGRAM(s) Oral every 6 hours  lactated ringers. 1000 milliLiter(s) (125 mL/Hr) IV Continuous <Continuous>  oxytocin Infusion 333.333 milliUNIT(s)/Min (1000 mL/Hr) IV Continuous <Continuous>  simethicone 80 milliGRAM(s) Chew every 4 hours    MEDICATIONS  (PRN):  diphenhydrAMINE 25 milliGRAM(s) Oral every 6 hours PRN Pruritus  lanolin Ointment 1 Application(s) Topical every 6 hours PRN Sore Nipples  magnesium hydroxide Suspension 30 milliLiter(s) Oral two times a day PRN Constipation  oxyCODONE    IR 5 milliGRAM(s) Oral every 3 hours PRN Severe Pain (7 - 10)  oxyCODONE    IR 5 milliGRAM(s) Oral once PRN Severe Pain (7 - 10)

## 2021-07-22 NOTE — DISCHARGE NOTE OB - PATIENT PORTAL LINK FT
You can access the FollowMyHealth Patient Portal offered by [facility] by registering at the following website: [email or web address]. By joining [employer]’s FollowMyHealth portal, you will also be able to view your health information using other applications (apps) compatible with our system.

## 2021-07-22 NOTE — PROGRESS NOTE ADULT - ASSESSMENT
35y now P2, s/p repeat LTCS POD2, EBL 800cc, h/o C/Sx1 with myomectomy, doing well  - encourage ambulation  - PO hydration  - Continue regularDiet as tolerated  - DVT ppx w/ SCDs, lovenox  -Incentive Spirometry bedside   - routine postpartum care   - monitor vitals/bleeding    Dr. Grace and attending to be made aware

## 2021-07-22 NOTE — DISCHARGE NOTE OB - ADDITIONAL INSTRUCTIONS
Keep incision clean and dry. Follow up in 1 week with your doctor for a blood pressure check/incision check and in 6 weeks for a postpartum check.

## 2021-07-22 NOTE — DISCHARGE NOTE OB - CARE PROVIDER_API CALL
Katya Carbajal)  Obstetrics and Gynecology  [address] [place] [address]  Phone: [phone]  Fax: [phone]  Follow Up Time: 1 week

## 2021-07-22 NOTE — DISCHARGE NOTE OB - HOSPITAL COURSE
Patient admitted for scheduled repeat c/s, underwent vaginal delivery and had an uncomplicated postpartum course, discharged on PPD 3.

## 2021-07-23 VITALS
TEMPERATURE: 98 F | SYSTOLIC BLOOD PRESSURE: 115 MMHG | HEART RATE: 97 BPM | RESPIRATION RATE: 18 BRPM | DIASTOLIC BLOOD PRESSURE: 60 MMHG

## 2021-07-23 PROCEDURE — 99238 HOSP IP/OBS DSCHRG MGMT 30/<: CPT

## 2021-07-23 RX ADMIN — SIMETHICONE 80 MILLIGRAM(S): 80 TABLET, CHEWABLE ORAL at 06:16

## 2021-07-23 RX ADMIN — SIMETHICONE 80 MILLIGRAM(S): 80 TABLET, CHEWABLE ORAL at 15:07

## 2021-07-23 NOTE — PROGRESS NOTE ADULT - ASSESSMENT
35y now P2, s/p repeat LTCS POD3, EBL 800cc, h/o C/Sx1 with myomectomy, doing well  - encourage ambulation  - PO hydration  - Continue regularDiet as tolerated  - DVT ppx w/ SCDs, lovenox  -Incentive Spirometry bedside   - routine postpartum care   - monitor vitals/bleeding  - instructed to follow up in 1 week for incision check, and 6 weeks for postpartum check      and attending to be made aware   35y now P2, s/p repeat LTCS POD3, EBL 800cc, h/o C/Sx1 with myomectomy, doing well    - encourage ambulation  - PO hydration  - Continue regular Diet as tolerated  - DVT ppx w/ SCDs, lovenox  - Incentive Spirometry bedside   - routine postpartum care   - monitor vitals/bleeding  - instructed to follow up in 1 week for incision check, and 6 weeks for postpartum check      and attending to be made aware

## 2021-07-23 NOTE — PROGRESS NOTE ADULT - SUBJECTIVE AND OBJECTIVE BOX
AUDREY BANERJEE  35y  Female  CC: Postpartum  PGY2 Note:  Patient seen and examined bedside. No overnight events. Denies heavy vaginal bleeding and reports pain well controlled. Ambulating (and voiding) without difficulty. Tolerating regular diet. Reports +flatus. Denies dizziness, lightheadedness, SOB, or palpitations. Denies fever, chills, nausea, vomiting.Breastfeeding.     PAST MEDICAL & SURGICAL HISTORY:  No pertinent past medical history    History of  section    H/O myomectomy        Physical Exam  Vital Signs Last 24 Hrs  T(C): 36.9 (2021 00:04), Max: 36.9 (2021 00:04)  T(F): 98.4 (2021 00:04), Max: 98.4 (2021 00:04)  HR: 87 (2021 00:04) (87 - 111)  BP: 108/59 (2021 00:04) (91/50 - 108/59)  RR: 18 (2021 00:04) (18 - 19)    Gen: AAOx3, NAD  CV: RRR. No murmors gallops or rubs.  Pulm: CTAB. No wheezes or rales.  Ext: No calf tenderness, no swelling.   Abd: Soft, nontender, nondistended, BS+  Fundus firm, and below umbilicus. Nontender.   Lochia: Minimal, rubra  Wound: Pfannenstiel incision clean, dry intact. Steris in place. No surrounding edema or erythema.     Labs:                        10.6   17.66 )-----------( 170      ( 2021 06:06 )             31.0                         12.8   11.90 )-----------( 186      ( 2021 06:55 )             37.7        MEDICATIONS  (STANDING):  acetaminophen   Tablet .. 975 milliGRAM(s) Oral <User Schedule>  diphtheria/tetanus/pertussis (acellular) Vaccine (ADAcel) 0.5 milliLiter(s) IntraMuscular once  enoxaparin Injectable 40 milliGRAM(s) SubCutaneous daily  ibuprofen  Tablet. 600 milliGRAM(s) Oral every 6 hours  lactated ringers. 1000 milliLiter(s) (125 mL/Hr) IV Continuous <Continuous>  oxytocin Infusion 333.333 milliUNIT(s)/Min (1000 mL/Hr) IV Continuous <Continuous>  simethicone 80 milliGRAM(s) Chew every 4 hours    MEDICATIONS  (PRN):  diphenhydrAMINE 25 milliGRAM(s) Oral every 6 hours PRN Pruritus  lanolin Ointment 1 Application(s) Topical every 6 hours PRN Sore Nipples  magnesium hydroxide Suspension 30 milliLiter(s) Oral two times a day PRN Constipation  oxyCODONE    IR 5 milliGRAM(s) Oral every 3 hours PRN Severe Pain (7 - 10)  oxyCODONE    IR 5 milliGRAM(s) Oral once PRN Severe Pain (7 - 10)

## 2021-07-23 NOTE — PROGRESS NOTE ADULT - ATTENDING COMMENTS
pod#2  stable  precautions given
Patient seen, case discussed with resident  POD#3 repeat  section doing well.  Stable for d/c home.   F/u 1week for incision check and 6 weeks for postpartum visit.  Precautions discussed.

## 2021-07-28 ENCOUNTER — OUTPATIENT (OUTPATIENT)
Dept: OUTPATIENT SERVICES | Facility: HOSPITAL | Age: 36
LOS: 1 days | Discharge: HOME | End: 2021-07-28

## 2021-07-28 ENCOUNTER — APPOINTMENT (OUTPATIENT)
Dept: OBGYN | Facility: CLINIC | Age: 36
End: 2021-07-28
Payer: MEDICAID

## 2021-07-28 VITALS
HEIGHT: 62.99 IN | WEIGHT: 148 LBS | DIASTOLIC BLOOD PRESSURE: 66 MMHG | BODY MASS INDEX: 26.22 KG/M2 | SYSTOLIC BLOOD PRESSURE: 100 MMHG

## 2021-07-28 DIAGNOSIS — Z98.891 HISTORY OF UTERINE SCAR FROM PREVIOUS SURGERY: Chronic | ICD-10-CM

## 2021-07-28 DIAGNOSIS — Z98.890 OTHER SPECIFIED POSTPROCEDURAL STATES: Chronic | ICD-10-CM

## 2021-07-28 PROCEDURE — 99213 OFFICE O/P EST LOW 20 MIN: CPT

## 2021-07-28 NOTE — HISTORY OF PRESENT ILLNESS
[Postpartum Follow Up] : postpartum follow up [Complications:___] : no complications [Delivery Date: ___] : on [unfilled] [Repeat C/S] : delivered by  section (repeat) [Male] : Delivery History: baby boy [Wt. ___] : weighing [unfilled] [Breastfeeding] : currently nursing [None] : No associated symptoms are reported [Clean/Dry/Intact] : clean, dry and intact [Erythema] : not erythematous [Swelling] : swollen [Dehiscence] : not dehisced [Healed] : healed [Back to Normal] : is still enlarged [Doing Well] : is doing well [No Sign of Infection] : is showing no signs of infection [Excellent Pain Control] : has excellent pain control [de-identified] : minimal swelling along superior edge of incision on left side [de-identified] : Wound check [de-identified] : RV 5 weeks for post partum visit

## 2021-08-01 DIAGNOSIS — O34.211 MATERNAL CARE FOR LOW TRANSVERSE SCAR FROM PREVIOUS CESAREAN DELIVERY: ICD-10-CM

## 2021-08-01 DIAGNOSIS — Z3A.39 39 WEEKS GESTATION OF PREGNANCY: ICD-10-CM

## 2021-08-01 DIAGNOSIS — Z34.83 ENCOUNTER FOR SUPERVISION OF OTHER NORMAL PREGNANCY, THIRD TRIMESTER: ICD-10-CM

## 2021-08-06 ENCOUNTER — APPOINTMENT (OUTPATIENT)
Dept: OBGYN | Facility: CLINIC | Age: 36
End: 2021-08-06
Payer: MEDICAID

## 2021-08-06 ENCOUNTER — NON-APPOINTMENT (OUTPATIENT)
Age: 36
End: 2021-08-06

## 2021-08-06 ENCOUNTER — INPATIENT (INPATIENT)
Facility: HOSPITAL | Age: 36
LOS: 3 days | Discharge: HOME HEALTH | End: 2021-08-10
Attending: STUDENT IN AN ORGANIZED HEALTH CARE EDUCATION/TRAINING PROGRAM | Admitting: STUDENT IN AN ORGANIZED HEALTH CARE EDUCATION/TRAINING PROGRAM
Payer: MEDICAID

## 2021-08-06 ENCOUNTER — OUTPATIENT (OUTPATIENT)
Dept: OUTPATIENT SERVICES | Facility: HOSPITAL | Age: 36
LOS: 1 days | Discharge: HOME | End: 2021-08-06

## 2021-08-06 VITALS
HEART RATE: 84 BPM | SYSTOLIC BLOOD PRESSURE: 131 MMHG | OXYGEN SATURATION: 98 % | HEIGHT: 62.99 IN | WEIGHT: 136.03 LBS | DIASTOLIC BLOOD PRESSURE: 60 MMHG | TEMPERATURE: 99 F | RESPIRATION RATE: 18 BRPM

## 2021-08-06 VITALS
DIASTOLIC BLOOD PRESSURE: 60 MMHG | HEIGHT: 62.99 IN | BODY MASS INDEX: 24.27 KG/M2 | WEIGHT: 137 LBS | SYSTOLIC BLOOD PRESSURE: 100 MMHG

## 2021-08-06 DIAGNOSIS — Z98.891 HISTORY OF UTERINE SCAR FROM PREVIOUS SURGERY: Chronic | ICD-10-CM

## 2021-08-06 DIAGNOSIS — Z98.890 OTHER SPECIFIED POSTPROCEDURAL STATES: Chronic | ICD-10-CM

## 2021-08-06 DIAGNOSIS — N82.5 FEMALE GENITAL TRACT-SKIN FISTULAE: ICD-10-CM

## 2021-08-06 DIAGNOSIS — Z51.89 ENCOUNTER FOR OTHER SPECIFIED AFTERCARE: ICD-10-CM

## 2021-08-06 LAB
ALBUMIN SERPL ELPH-MCNC: 3.4 G/DL — LOW (ref 3.5–5.2)
ALP SERPL-CCNC: 98 U/L — SIGNIFICANT CHANGE UP (ref 30–115)
ALT FLD-CCNC: 9 U/L — SIGNIFICANT CHANGE UP (ref 0–41)
ANION GAP SERPL CALC-SCNC: 12 MMOL/L — SIGNIFICANT CHANGE UP (ref 7–14)
AST SERPL-CCNC: 29 U/L — SIGNIFICANT CHANGE UP (ref 0–41)
BASOPHILS # BLD AUTO: 0.04 K/UL — SIGNIFICANT CHANGE UP (ref 0–0.2)
BASOPHILS NFR BLD AUTO: 0.5 % — SIGNIFICANT CHANGE UP (ref 0–1)
BILIRUB SERPL-MCNC: <0.2 MG/DL — SIGNIFICANT CHANGE UP (ref 0.2–1.2)
BLD GP AB SCN SERPL QL: SIGNIFICANT CHANGE UP
BUN SERPL-MCNC: 13 MG/DL — SIGNIFICANT CHANGE UP (ref 10–20)
CALCIUM SERPL-MCNC: 9.2 MG/DL — SIGNIFICANT CHANGE UP (ref 8.5–10.1)
CHLORIDE SERPL-SCNC: 104 MMOL/L — SIGNIFICANT CHANGE UP (ref 98–110)
CO2 SERPL-SCNC: 21 MMOL/L — SIGNIFICANT CHANGE UP (ref 17–32)
CREAT SERPL-MCNC: 0.6 MG/DL — LOW (ref 0.7–1.5)
EOSINOPHIL # BLD AUTO: 0.11 K/UL — SIGNIFICANT CHANGE UP (ref 0–0.7)
EOSINOPHIL NFR BLD AUTO: 1.5 % — SIGNIFICANT CHANGE UP (ref 0–8)
GLUCOSE SERPL-MCNC: 93 MG/DL — SIGNIFICANT CHANGE UP (ref 70–99)
HCT VFR BLD CALC: 32.6 % — LOW (ref 37–47)
HGB BLD-MCNC: 10.6 G/DL — LOW (ref 12–16)
IMM GRANULOCYTES NFR BLD AUTO: 0.9 % — HIGH (ref 0.1–0.3)
LACTATE SERPL-SCNC: 0.6 MMOL/L — LOW (ref 0.7–2)
LYMPHOCYTES # BLD AUTO: 1.06 K/UL — LOW (ref 1.2–3.4)
LYMPHOCYTES # BLD AUTO: 14.2 % — LOW (ref 20.5–51.1)
MCHC RBC-ENTMCNC: 27.2 PG — SIGNIFICANT CHANGE UP (ref 27–31)
MCHC RBC-ENTMCNC: 32.5 G/DL — SIGNIFICANT CHANGE UP (ref 32–37)
MCV RBC AUTO: 83.8 FL — SIGNIFICANT CHANGE UP (ref 81–99)
MONOCYTES # BLD AUTO: 0.57 K/UL — SIGNIFICANT CHANGE UP (ref 0.1–0.6)
MONOCYTES NFR BLD AUTO: 7.6 % — SIGNIFICANT CHANGE UP (ref 1.7–9.3)
NEUTROPHILS # BLD AUTO: 5.64 K/UL — SIGNIFICANT CHANGE UP (ref 1.4–6.5)
NEUTROPHILS NFR BLD AUTO: 75.3 % — HIGH (ref 42.2–75.2)
NRBC # BLD: 0 /100 WBCS — SIGNIFICANT CHANGE UP (ref 0–0)
PLATELET # BLD AUTO: 386 K/UL — SIGNIFICANT CHANGE UP (ref 130–400)
POTASSIUM SERPL-MCNC: 4.9 MMOL/L — SIGNIFICANT CHANGE UP (ref 3.5–5)
POTASSIUM SERPL-SCNC: 4.9 MMOL/L — SIGNIFICANT CHANGE UP (ref 3.5–5)
PROT SERPL-MCNC: 6.5 G/DL — SIGNIFICANT CHANGE UP (ref 6–8)
RBC # BLD: 3.89 M/UL — LOW (ref 4.2–5.4)
RBC # FLD: 13.2 % — SIGNIFICANT CHANGE UP (ref 11.5–14.5)
SARS-COV-2 RNA SPEC QL NAA+PROBE: SIGNIFICANT CHANGE UP
SODIUM SERPL-SCNC: 137 MMOL/L — SIGNIFICANT CHANGE UP (ref 135–146)
WBC # BLD: 7.49 K/UL — SIGNIFICANT CHANGE UP (ref 4.8–10.8)
WBC # FLD AUTO: 7.49 K/UL — SIGNIFICANT CHANGE UP (ref 4.8–10.8)

## 2021-08-06 PROCEDURE — 74177 CT ABD & PELVIS W/CONTRAST: CPT | Mod: 26,MA

## 2021-08-06 PROCEDURE — 99213 OFFICE O/P EST LOW 20 MIN: CPT

## 2021-08-06 PROCEDURE — 99221 1ST HOSP IP/OBS SF/LOW 40: CPT

## 2021-08-06 PROCEDURE — 99222 1ST HOSP IP/OBS MODERATE 55: CPT

## 2021-08-06 PROCEDURE — 99285 EMERGENCY DEPT VISIT HI MDM: CPT

## 2021-08-06 RX ORDER — VANCOMYCIN HCL 1 G
1000 VIAL (EA) INTRAVENOUS ONCE
Refills: 0 | Status: COMPLETED | OUTPATIENT
Start: 2021-08-06 | End: 2021-08-06

## 2021-08-06 RX ORDER — SODIUM CHLORIDE 9 MG/ML
1000 INJECTION, SOLUTION INTRAVENOUS ONCE
Refills: 0 | Status: COMPLETED | OUTPATIENT
Start: 2021-08-06 | End: 2021-08-06

## 2021-08-06 RX ORDER — PIPERACILLIN AND TAZOBACTAM 4; .5 G/20ML; G/20ML
3.38 INJECTION, POWDER, LYOPHILIZED, FOR SOLUTION INTRAVENOUS ONCE
Refills: 0 | Status: COMPLETED | OUTPATIENT
Start: 2021-08-06 | End: 2021-08-06

## 2021-08-06 RX ORDER — PIPERACILLIN AND TAZOBACTAM 4; .5 G/20ML; G/20ML
3.38 INJECTION, POWDER, LYOPHILIZED, FOR SOLUTION INTRAVENOUS EVERY 8 HOURS
Refills: 0 | Status: DISCONTINUED | OUTPATIENT
Start: 2021-08-06 | End: 2021-08-10

## 2021-08-06 RX ORDER — ACETAMINOPHEN 500 MG
650 TABLET ORAL EVERY 6 HOURS
Refills: 0 | Status: DISCONTINUED | OUTPATIENT
Start: 2021-08-06 | End: 2021-08-10

## 2021-08-06 RX ORDER — VANCOMYCIN HCL 1 G
1200 VIAL (EA) INTRAVENOUS EVERY 12 HOURS
Refills: 0 | Status: DISCONTINUED | OUTPATIENT
Start: 2021-08-07 | End: 2021-08-07

## 2021-08-06 RX ORDER — IBUPROFEN 200 MG
600 TABLET ORAL EVERY 6 HOURS
Refills: 0 | Status: DISCONTINUED | OUTPATIENT
Start: 2021-08-06 | End: 2021-08-10

## 2021-08-06 RX ADMIN — SODIUM CHLORIDE 1000 MILLILITER(S): 9 INJECTION, SOLUTION INTRAVENOUS at 22:59

## 2021-08-06 RX ADMIN — PIPERACILLIN AND TAZOBACTAM 200 GRAM(S): 4; .5 INJECTION, POWDER, LYOPHILIZED, FOR SOLUTION INTRAVENOUS at 22:59

## 2021-08-06 NOTE — H&P ADULT - ATTENDING COMMENTS
pt seen and examined  s/p repeat c/s with abscess, possible connection between uterus and anterior abdomen on CT  admit for IV abx and reassess in 24-48 hours  otherwise labs and VS stable, no signs of systemic infection

## 2021-08-06 NOTE — H&P ADULT - NSHPPHYSICALEXAM_GEN_ALL_CORE
Vital Signs Last 24 Hrs  T(C): 36.6 (06 Aug 2021 15:41), Max: 37 (06 Aug 2021 12:05)  T(F): 97.8 (06 Aug 2021 15:41), Max: 98.6 (06 Aug 2021 12:05)  HR: 77 (06 Aug 2021 15:41) (77 - 84)  BP: 102/65 (06 Aug 2021 15:41) (102/65 - 131/60)  BP(mean): --  RR: 19 (06 Aug 2021 15:41) (18 - 19)  SpO2: 99% (06 Aug 2021 15:41) (98% - 99%)    General Appearance - AAOx3, NAD  Heart - S1S2 regular rate and rhythm  Lung - CTA Bilaterally  Abdomen - Soft, nontender, nondistended, no rebound, no rigidity, no guarding, bowel sounds present, pfannenstiel skin incision with surrounding erythema, 4cm around incision, with a 2 cm skin opening, incision tracked 8cm to the right, 1cm to the left, up and down. 8cm deep, previous packing replaced    : deferred

## 2021-08-06 NOTE — ED PROVIDER NOTE - PROGRESS NOTE DETAILS
Attending Note: 34 y/o F PMHx recent  x7 days ago p/w dehiscence of her wound. Plan: CT abd/pelvis per GYN team, GYN consult, and reassess.

## 2021-08-06 NOTE — HISTORY OF PRESENT ILLNESS
[FreeTextEntry1] : 36yo 3 weeks s/p repeat  section here for urgent visit due to drainage of yellow liquid from  wound.  No fevers or chills, otherwise feeling well.  Pt feels pain at incision and reports it feels swollen.

## 2021-08-06 NOTE — ED ADULT NURSE NOTE - OBJECTIVE STATEMENT
Patient is a 35 year old female complaining of csection drainage and pain and redness for 2 days. Patient had csection 2 weeks ago

## 2021-08-06 NOTE — ED ADULT NURSE NOTE - NS ED PATIENT SAFETY CONCERN
You can access the [facility] Patient Portal, offered by [facility], by registering with the following website: [email or web address] No

## 2021-08-06 NOTE — ED PROVIDER NOTE - PHYSICAL EXAMINATION
VITALS: Reviewed  CONSTITUTIONAL: well developed, well nourished, in no acute distress, speaking in full sentences, nontoxic appearing  SKIN: warm, dry, no rash  HEAD: normocephalic, atraumatic  EYES: PERRL, EOMI, no conjunctival erythema, sclera clear  ENT: patent airway, moist mucous membranes  NECK: supple, no masses  CV:  regular rate, regular rhythm, 2+ radial pulses bilaterally  RESP: no wheezes, no rales, no rhonchi, normal work of breathing  ABD: soft, nontender, nondistended, no rebound, no guarding--lower abdominal  incision mild erythema, mild tenderness, no active discharge, packing in place from clinic  MSK: normal ROM, no cyanosis, no edema  NEURO: alert, oriented x3  PSYCH: cooperative, appropriate

## 2021-08-06 NOTE — ED PROVIDER NOTE - OBJECTIVE STATEMENT
35Y F with PMH of   Dr. Carbajal, no other medical problems presents sent in from women's health clinic for lower abdominal pain. Patient says that she has noticed some erythema around the  wound site, no discharge up until today at the women's health clinic, no fevers, no N/V. Denies abdominal pain, dysuria/hematuria, vaginal discharge/bleeding.

## 2021-08-06 NOTE — PHYSICAL EXAM
[Appropriately responsive] : appropriately responsive [Alert] : alert [No Acute Distress] : no acute distress [Oriented x3] : oriented x3 [FreeTextEntry7] : Incision w/ mild erythema, area of drainage on right side of incision probed and copious purulent fluid drained, culture taken, incision tracking to the left about 10cm, probed about 8cm deep

## 2021-08-06 NOTE — H&P ADULT - NSHPLABSRESULTS_GEN_ALL_CORE
10.6   7.49  )-----------( 386      ( 06 Aug 2021 13:00 )             32.6   08-06    137  |  104  |  13  ----------------------------<  93  4.9   |  21  |  0.6<L>    Ca    9.2      06 Aug 2021 13:00    TPro  6.5  /  Alb  3.4<L>  /  TBili  <0.2  /  DBili  x   /  AST  29  /  ALT  9   /  AlkPhos  98      < from: CT Abdomen and Pelvis w/ IV Cont (21 @ 19:29) >    EXAM:  CT ABDOMEN AND PELVIS IC            PROCEDURE DATE:  2021      INTERPRETATION:  CLINICAL STATEMENT: Abdominal pain. Status post  with purulent drainage from incision site.    TECHNIQUE: Contiguous axial CT images were obtained from the lower chest to the pubic symphysis following administration of 95 cc Omnipaque 350 intravenous contrast.  Oral contrast was not administered.  Reformatted images in the coronal and sagittal planes were acquired.  COMPARISON CT: None.    FINDINGS:  LOWER CHEST: Unremarkable.  HEPATOBILIARY: The hepatic parenchyma is unremarkable. No intrahepatic or extra hepatic biliary ductal dilatation. The gallbladder is present.  SPLEEN: Unremarkable.  PANCREAS: Unremarkable.  ADRENAL GLANDS: Unremarkable.  KIDNEYS: Symmetric nephrogram. No hydronephrosis or obstructing renal calculus.  ABDOMINOPELVIC NODES: Prominent inguinal lymph nodes, likely reactive.  PELVIC ORGANS: 4.8 x 1.4 x 3.6 cm rim enhancing fluid collection extending from the endometrial cavity into the ventral abdominopelvic soft tissues. Gas is noted in the vaginal canal.  PERITONEUM/MESENTERY/BOWEL: No evidence of bowel obstruction, pneumoperitoneum, or ascites.  BONES/SOFT TISSUES: Soft tissue gas and inflammatory change at the ventral abdominopelvic incision site. Diastases of the anterior abdominal wall.  OTHER: Normal caliber aorta.    IMPRESSION:  4.8 cm abscess extending from the endometrial cavity into the ventral abdominopelvic soft tissues at the patient's  incision site with surrounding inflammatory change and foci of soft tissue gas.    < end of copied text >

## 2021-08-06 NOTE — DISCUSSION/SUMMARY
[FreeTextEntry1] : 34yo with  wound dehiscence \par -Pt instructed to go to ER now to rule out fashia dehiscence due to depth of probe and decide +/- admission for IV antibiotics\par -Otherwise, will need VNS for daily wound care w/ saline flush and wet/dry packing

## 2021-08-06 NOTE — H&P ADULT - ASSESSMENT
34 y/o P2 s/p repeat c/s on 7/20, POD#17, with a 4.8 cm abscess, currently clinically and hemodynamically stable, admit to GYN  - Zosyn 3.375g q6hrs + vancomycin 20mg/kg q8 hrs  - reg diet  - IR consult for possible drainage  - monitor temps and vitals  - tylenol and motrin prn for pain management  - f/u bcx x2  - packing change daily  - consider repeating CT scan on Monday   - AM labs    Dr. Jameson and Dr. Tran aware

## 2021-08-07 LAB
BASOPHILS # BLD AUTO: 0.04 K/UL — SIGNIFICANT CHANGE UP (ref 0–0.2)
BASOPHILS NFR BLD AUTO: 0.6 % — SIGNIFICANT CHANGE UP (ref 0–1)
EOSINOPHIL # BLD AUTO: 0.09 K/UL — SIGNIFICANT CHANGE UP (ref 0–0.7)
EOSINOPHIL NFR BLD AUTO: 1.4 % — SIGNIFICANT CHANGE UP (ref 0–8)
HCT VFR BLD CALC: 35.2 % — LOW (ref 37–47)
HGB BLD-MCNC: 11.2 G/DL — LOW (ref 12–16)
IMM GRANULOCYTES NFR BLD AUTO: 0.9 % — HIGH (ref 0.1–0.3)
LYMPHOCYTES # BLD AUTO: 1.15 K/UL — LOW (ref 1.2–3.4)
LYMPHOCYTES # BLD AUTO: 17.8 % — LOW (ref 20.5–51.1)
MAGNESIUM SERPL-MCNC: 2.1 MG/DL — SIGNIFICANT CHANGE UP (ref 1.8–2.4)
MCHC RBC-ENTMCNC: 27.1 PG — SIGNIFICANT CHANGE UP (ref 27–31)
MCHC RBC-ENTMCNC: 31.8 G/DL — LOW (ref 32–37)
MCV RBC AUTO: 85 FL — SIGNIFICANT CHANGE UP (ref 81–99)
MONOCYTES # BLD AUTO: 0.6 K/UL — SIGNIFICANT CHANGE UP (ref 0.1–0.6)
MONOCYTES NFR BLD AUTO: 9.3 % — SIGNIFICANT CHANGE UP (ref 1.7–9.3)
NEUTROPHILS # BLD AUTO: 4.52 K/UL — SIGNIFICANT CHANGE UP (ref 1.4–6.5)
NEUTROPHILS NFR BLD AUTO: 70 % — SIGNIFICANT CHANGE UP (ref 42.2–75.2)
NRBC # BLD: 0 /100 WBCS — SIGNIFICANT CHANGE UP (ref 0–0)
PLATELET # BLD AUTO: 530 K/UL — HIGH (ref 130–400)
RBC # BLD: 4.14 M/UL — LOW (ref 4.2–5.4)
RBC # FLD: 13.2 % — SIGNIFICANT CHANGE UP (ref 11.5–14.5)
WBC # BLD: 6.46 K/UL — SIGNIFICANT CHANGE UP (ref 4.8–10.8)
WBC # FLD AUTO: 6.46 K/UL — SIGNIFICANT CHANGE UP (ref 4.8–10.8)

## 2021-08-07 PROCEDURE — 99233 SBSQ HOSP IP/OBS HIGH 50: CPT

## 2021-08-07 RX ORDER — VANCOMYCIN HCL 1 G
1250 VIAL (EA) INTRAVENOUS EVERY 12 HOURS
Refills: 0 | Status: DISCONTINUED | OUTPATIENT
Start: 2021-08-07 | End: 2021-08-10

## 2021-08-07 RX ORDER — VANCOMYCIN HCL 1 G
VIAL (EA) INTRAVENOUS
Refills: 0 | Status: DISCONTINUED | OUTPATIENT
Start: 2021-08-07 | End: 2021-08-10

## 2021-08-07 RX ORDER — VANCOMYCIN HCL 1 G
1250 VIAL (EA) INTRAVENOUS ONCE
Refills: 0 | Status: COMPLETED | OUTPATIENT
Start: 2021-08-07 | End: 2021-08-07

## 2021-08-07 RX ADMIN — Medication 250 MILLIGRAM(S): at 01:52

## 2021-08-07 RX ADMIN — Medication 166.67 MILLIGRAM(S): at 08:14

## 2021-08-07 RX ADMIN — PIPERACILLIN AND TAZOBACTAM 25 GRAM(S): 4; .5 INJECTION, POWDER, LYOPHILIZED, FOR SOLUTION INTRAVENOUS at 12:58

## 2021-08-07 RX ADMIN — PIPERACILLIN AND TAZOBACTAM 25 GRAM(S): 4; .5 INJECTION, POWDER, LYOPHILIZED, FOR SOLUTION INTRAVENOUS at 22:32

## 2021-08-07 RX ADMIN — Medication 166.67 MILLIGRAM(S): at 17:30

## 2021-08-07 NOTE — PROGRESS NOTE ADULT - SUBJECTIVE AND OBJECTIVE BOX
PGY 3 Note    Patient examined at bedside, pain well controlled on PO medications. Reports moderate abdominal pain near her incision. Reports minimal drainage on her dressing. Denies fevers/chills, HA/N/V, CP/SOB/palpitations, vaginal bleeding, hematuria/dysuria, constipation/diarrhea. Tolerating regular diet, passing/no flatus. Ambulating. Using incentive spirometry.     T(F): 98.2 (08-07-21 @ 08:48), Max: 98.2 (08-07-21 @ 04:37)  HR: 80 (08-07-21 @ 08:48) (62 - 80)  BP: 103/63 (08-07-21 @ 08:48) (95/56 - 104/60)  RR: 20 (08-07-21 @ 08:48) (17 - 20)  SpO2: 99% (08-07-21 @ 08:48) (98% - 100%)      Physical Exam:  General: AAOx3. NAD  CVS: RRR. Nl S1S2  Lungs: CTAB  Abdomen: soft, appropriately tender near her incision site, non-distended, +BSx4  Incision: approx 2cm open with incision tracking 4cm deep and 8cm to the right, packing changed no erythema, no draining  VE: deferred, no bleeding on pad/chux  Ext: No edema. SCDs in place    Labs:             10.6<L>  7.49  )-----------( 386      ( 08-06 @ 13:00 )             32.6<L>     08-06    137  |  104  |  13  ----------------------------<  93  4.9   |  21  |  0.6<L>    Ca    9.2      06 Aug 2021 13:00    TPro  6.5  /  Alb  3.4<L>  /  TBili  <0.2  /  DBili  x   /  AST  29  /  ALT  9   /  AlkPhos  98  08-06            Trend:             10.6<L>  7.49  )-----------( 386      ( 08-06 @ 13:00 )             32.6<L>        Creatinine, Serum: 0.6 (08-06)      Medications:  MEDICATIONS  (STANDING):  piperacillin/tazobactam IVPB.. 3.375 Gram(s) IV Intermittent every 8 hours  vancomycin  IVPB      vancomycin  IVPB 1250 milliGRAM(s) IV Intermittent every 12 hours    MEDICATIONS  (PRN):  acetaminophen   Tablet .. 650 milliGRAM(s) Oral every 6 hours PRN Temp greater or equal to 38C (100.4F), Moderate Pain (4 - 6)  ibuprofen  Tablet. 600 milliGRAM(s) Oral every 6 hours PRN Moderate Pain (4 - 6)         PGY 3 Note    Patient examined at bedside, pain well controlled on PO medications. Reports moderate abdominal pain near her incision. Reports minimal drainage on her dressing. Denies fevers/chills, HA/N/V, CP/SOB/palpitations, vaginal bleeding, hematuria/dysuria, constipation/diarrhea. Tolerating regular diet, passing/no flatus. Ambulating. Using incentive spirometry.     T(F): 98.2 (08-07-21 @ 08:48), Max: 98.2 (08-07-21 @ 04:37)  HR: 80 (08-07-21 @ 08:48) (62 - 80)  BP: 103/63 (08-07-21 @ 08:48) (95/56 - 104/60)  RR: 20 (08-07-21 @ 08:48) (17 - 20)  SpO2: 99% (08-07-21 @ 08:48) (98% - 100%)      Physical Exam:  General: AAOx3. NAD  CVS: RRR. Nl S1S2  Lungs: CTAB  Abdomen: soft, appropriately tender near her incision site, moderately distended, +BSx4  Incision: approx 2cm open with incision tracking 6cm deep and 8cm to the right, packing changed, erythema noted along the skin edge  VE: deferred, no bleeding on pad/chux  Ext: No edema. SCDs in place    Labs:             10.6<L>  7.49  )-----------( 386      ( 08-06 @ 13:00 )             32.6<L>     08-06    137  |  104  |  13  ----------------------------<  93  4.9   |  21  |  0.6<L>    Ca    9.2      06 Aug 2021 13:00    TPro  6.5  /  Alb  3.4<L>  /  TBili  <0.2  /  DBili  x   /  AST  29  /  ALT  9   /  AlkPhos  98  08-06            Trend:             10.6<L>  7.49  )-----------( 386      ( 08-06 @ 13:00 )             32.6<L>        Creatinine, Serum: 0.6 (08-06)      Medications:  MEDICATIONS  (STANDING):  piperacillin/tazobactam IVPB.. 3.375 Gram(s) IV Intermittent every 8 hours  vancomycin  IVPB      vancomycin  IVPB 1250 milliGRAM(s) IV Intermittent every 12 hours    MEDICATIONS  (PRN):  acetaminophen   Tablet .. 650 milliGRAM(s) Oral every 6 hours PRN Temp greater or equal to 38C (100.4F), Moderate Pain (4 - 6)  ibuprofen  Tablet. 600 milliGRAM(s) Oral every 6 hours PRN Moderate Pain (4 - 6)

## 2021-08-07 NOTE — PROGRESS NOTE ADULT - ASSESSMENT
INCOMPLETE      Dr. 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 Green aware

## 2021-08-08 LAB
ALBUMIN SERPL ELPH-MCNC: 3.5 G/DL — SIGNIFICANT CHANGE UP (ref 3.5–5.2)
ALBUMIN SERPL ELPH-MCNC: 3.8 G/DL — SIGNIFICANT CHANGE UP (ref 3.5–5.2)
ALP SERPL-CCNC: 103 U/L — SIGNIFICANT CHANGE UP (ref 30–115)
ALP SERPL-CCNC: 90 U/L — SIGNIFICANT CHANGE UP (ref 30–115)
ALT FLD-CCNC: 8 U/L — SIGNIFICANT CHANGE UP (ref 0–41)
ALT FLD-CCNC: 8 U/L — SIGNIFICANT CHANGE UP (ref 0–41)
ANION GAP SERPL CALC-SCNC: 10 MMOL/L — SIGNIFICANT CHANGE UP (ref 7–14)
ANION GAP SERPL CALC-SCNC: 12 MMOL/L — SIGNIFICANT CHANGE UP (ref 7–14)
APTT BLD: 39 SEC — SIGNIFICANT CHANGE UP (ref 27–39.2)
AST SERPL-CCNC: 8 U/L — SIGNIFICANT CHANGE UP (ref 0–41)
AST SERPL-CCNC: 8 U/L — SIGNIFICANT CHANGE UP (ref 0–41)
BASOPHILS # BLD AUTO: 0.05 K/UL — SIGNIFICANT CHANGE UP (ref 0–0.2)
BASOPHILS # BLD AUTO: 0.05 K/UL — SIGNIFICANT CHANGE UP (ref 0–0.2)
BASOPHILS NFR BLD AUTO: 0.6 % — SIGNIFICANT CHANGE UP (ref 0–1)
BASOPHILS NFR BLD AUTO: 0.7 % — SIGNIFICANT CHANGE UP (ref 0–1)
BILIRUB SERPL-MCNC: 0.3 MG/DL — SIGNIFICANT CHANGE UP (ref 0.2–1.2)
BILIRUB SERPL-MCNC: <0.2 MG/DL — SIGNIFICANT CHANGE UP (ref 0.2–1.2)
BUN SERPL-MCNC: 11 MG/DL — SIGNIFICANT CHANGE UP (ref 10–20)
BUN SERPL-MCNC: 13 MG/DL — SIGNIFICANT CHANGE UP (ref 10–20)
CALCIUM SERPL-MCNC: 9.3 MG/DL — SIGNIFICANT CHANGE UP (ref 8.5–10.1)
CALCIUM SERPL-MCNC: 9.6 MG/DL — SIGNIFICANT CHANGE UP (ref 8.5–10.1)
CHLORIDE SERPL-SCNC: 101 MMOL/L — SIGNIFICANT CHANGE UP (ref 98–110)
CHLORIDE SERPL-SCNC: 104 MMOL/L — SIGNIFICANT CHANGE UP (ref 98–110)
CO2 SERPL-SCNC: 23 MMOL/L — SIGNIFICANT CHANGE UP (ref 17–32)
CO2 SERPL-SCNC: 26 MMOL/L — SIGNIFICANT CHANGE UP (ref 17–32)
COVID-19 SPIKE DOMAIN AB INTERP: POSITIVE
COVID-19 SPIKE DOMAIN ANTIBODY RESULT: 62.6 U/ML — HIGH
CREAT SERPL-MCNC: 0.7 MG/DL — SIGNIFICANT CHANGE UP (ref 0.7–1.5)
CREAT SERPL-MCNC: 0.8 MG/DL — SIGNIFICANT CHANGE UP (ref 0.7–1.5)
EOSINOPHIL # BLD AUTO: 0.16 K/UL — SIGNIFICANT CHANGE UP (ref 0–0.7)
EOSINOPHIL # BLD AUTO: 0.18 K/UL — SIGNIFICANT CHANGE UP (ref 0–0.7)
EOSINOPHIL NFR BLD AUTO: 2 % — SIGNIFICANT CHANGE UP (ref 0–8)
EOSINOPHIL NFR BLD AUTO: 2.5 % — SIGNIFICANT CHANGE UP (ref 0–8)
ERYTHROCYTE [SEDIMENTATION RATE] IN BLOOD: 43 MM/HR — HIGH (ref 0–20)
GLUCOSE SERPL-MCNC: 101 MG/DL — HIGH (ref 70–99)
GLUCOSE SERPL-MCNC: 101 MG/DL — HIGH (ref 70–99)
HCT VFR BLD CALC: 33.2 % — LOW (ref 37–47)
HCT VFR BLD CALC: 37.6 % — SIGNIFICANT CHANGE UP (ref 37–47)
HGB BLD-MCNC: 10.8 G/DL — LOW (ref 12–16)
HGB BLD-MCNC: 12.2 G/DL — SIGNIFICANT CHANGE UP (ref 12–16)
IMM GRANULOCYTES NFR BLD AUTO: 0.7 % — HIGH (ref 0.1–0.3)
IMM GRANULOCYTES NFR BLD AUTO: 0.8 % — HIGH (ref 0.1–0.3)
INR BLD: 1.07 RATIO — SIGNIFICANT CHANGE UP (ref 0.65–1.3)
LACTATE SERPL-SCNC: 1.2 MMOL/L — SIGNIFICANT CHANGE UP (ref 0.7–2)
LACTATE SERPL-SCNC: 2.3 MMOL/L — HIGH (ref 0.7–2)
LYMPHOCYTES # BLD AUTO: 1.1 K/UL — LOW (ref 1.2–3.4)
LYMPHOCYTES # BLD AUTO: 1.18 K/UL — LOW (ref 1.2–3.4)
LYMPHOCYTES # BLD AUTO: 14.6 % — LOW (ref 20.5–51.1)
LYMPHOCYTES # BLD AUTO: 15.5 % — LOW (ref 20.5–51.1)
MAGNESIUM SERPL-MCNC: 2 MG/DL — SIGNIFICANT CHANGE UP (ref 1.8–2.4)
MAGNESIUM SERPL-MCNC: 2 MG/DL — SIGNIFICANT CHANGE UP (ref 1.8–2.4)
MCHC RBC-ENTMCNC: 27.9 PG — SIGNIFICANT CHANGE UP (ref 27–31)
MCHC RBC-ENTMCNC: 27.9 PG — SIGNIFICANT CHANGE UP (ref 27–31)
MCHC RBC-ENTMCNC: 32.4 G/DL — SIGNIFICANT CHANGE UP (ref 32–37)
MCHC RBC-ENTMCNC: 32.5 G/DL — SIGNIFICANT CHANGE UP (ref 32–37)
MCV RBC AUTO: 85.8 FL — SIGNIFICANT CHANGE UP (ref 81–99)
MCV RBC AUTO: 86 FL — SIGNIFICANT CHANGE UP (ref 81–99)
MONOCYTES # BLD AUTO: 0.53 K/UL — SIGNIFICANT CHANGE UP (ref 0.1–0.6)
MONOCYTES # BLD AUTO: 0.61 K/UL — HIGH (ref 0.1–0.6)
MONOCYTES NFR BLD AUTO: 7.5 % — SIGNIFICANT CHANGE UP (ref 1.7–9.3)
MONOCYTES NFR BLD AUTO: 7.6 % — SIGNIFICANT CHANGE UP (ref 1.7–9.3)
NEUTROPHILS # BLD AUTO: 5.16 K/UL — SIGNIFICANT CHANGE UP (ref 1.4–6.5)
NEUTROPHILS # BLD AUTO: 6 K/UL — SIGNIFICANT CHANGE UP (ref 1.4–6.5)
NEUTROPHILS NFR BLD AUTO: 73 % — SIGNIFICANT CHANGE UP (ref 42.2–75.2)
NEUTROPHILS NFR BLD AUTO: 74.5 % — SIGNIFICANT CHANGE UP (ref 42.2–75.2)
NRBC # BLD: 0 /100 WBCS — SIGNIFICANT CHANGE UP (ref 0–0)
NRBC # BLD: 0 /100 WBCS — SIGNIFICANT CHANGE UP (ref 0–0)
PHOSPHATE SERPL-MCNC: 3.9 MG/DL — SIGNIFICANT CHANGE UP (ref 2.1–4.9)
PHOSPHATE SERPL-MCNC: 4 MG/DL — SIGNIFICANT CHANGE UP (ref 2.1–4.9)
PLATELET # BLD AUTO: 500 K/UL — HIGH (ref 130–400)
PLATELET # BLD AUTO: 526 K/UL — HIGH (ref 130–400)
POTASSIUM SERPL-MCNC: 4.6 MMOL/L — SIGNIFICANT CHANGE UP (ref 3.5–5)
POTASSIUM SERPL-MCNC: 4.7 MMOL/L — SIGNIFICANT CHANGE UP (ref 3.5–5)
POTASSIUM SERPL-SCNC: 4.6 MMOL/L — SIGNIFICANT CHANGE UP (ref 3.5–5)
POTASSIUM SERPL-SCNC: 4.7 MMOL/L — SIGNIFICANT CHANGE UP (ref 3.5–5)
PROT SERPL-MCNC: 6.2 G/DL — SIGNIFICANT CHANGE UP (ref 6–8)
PROT SERPL-MCNC: 6.8 G/DL — SIGNIFICANT CHANGE UP (ref 6–8)
PROTHROM AB SERPL-ACNC: 12.3 SEC — SIGNIFICANT CHANGE UP (ref 9.95–12.87)
RBC # BLD: 3.87 M/UL — LOW (ref 4.2–5.4)
RBC # BLD: 4.37 M/UL — SIGNIFICANT CHANGE UP (ref 4.2–5.4)
RBC # FLD: 13.2 % — SIGNIFICANT CHANGE UP (ref 11.5–14.5)
RBC # FLD: 13.2 % — SIGNIFICANT CHANGE UP (ref 11.5–14.5)
SARS-COV-2 IGG+IGM SERPL QL IA: 62.6 U/ML — HIGH
SARS-COV-2 IGG+IGM SERPL QL IA: POSITIVE
SODIUM SERPL-SCNC: 136 MMOL/L — SIGNIFICANT CHANGE UP (ref 135–146)
SODIUM SERPL-SCNC: 140 MMOL/L — SIGNIFICANT CHANGE UP (ref 135–146)
VANCOMYCIN TROUGH SERPL-MCNC: 10.9 UG/ML — HIGH (ref 5–10)
WBC # BLD: 7.08 K/UL — SIGNIFICANT CHANGE UP (ref 4.8–10.8)
WBC # BLD: 8.06 K/UL — SIGNIFICANT CHANGE UP (ref 4.8–10.8)
WBC # FLD AUTO: 7.08 K/UL — SIGNIFICANT CHANGE UP (ref 4.8–10.8)
WBC # FLD AUTO: 8.06 K/UL — SIGNIFICANT CHANGE UP (ref 4.8–10.8)

## 2021-08-08 RX ORDER — SODIUM CHLORIDE 9 MG/ML
1000 INJECTION, SOLUTION INTRAVENOUS
Refills: 0 | Status: DISCONTINUED | OUTPATIENT
Start: 2021-08-09 | End: 2021-08-10

## 2021-08-08 RX ADMIN — PIPERACILLIN AND TAZOBACTAM 25 GRAM(S): 4; .5 INJECTION, POWDER, LYOPHILIZED, FOR SOLUTION INTRAVENOUS at 21:11

## 2021-08-08 RX ADMIN — PIPERACILLIN AND TAZOBACTAM 25 GRAM(S): 4; .5 INJECTION, POWDER, LYOPHILIZED, FOR SOLUTION INTRAVENOUS at 13:13

## 2021-08-08 RX ADMIN — Medication 166.67 MILLIGRAM(S): at 10:45

## 2021-08-08 RX ADMIN — Medication 166.67 MILLIGRAM(S): at 21:09

## 2021-08-08 RX ADMIN — PIPERACILLIN AND TAZOBACTAM 25 GRAM(S): 4; .5 INJECTION, POWDER, LYOPHILIZED, FOR SOLUTION INTRAVENOUS at 06:14

## 2021-08-08 NOTE — PROGRESS NOTE ADULT - SUBJECTIVE AND OBJECTIVE BOX
PGY 3 Note    Patient examined at bedside, pain well controlled on PO medications. Reports improved abdominal pain near her incision. Reports minimal drainage on her dressing. Denies fevers/chills, HA/N/V, CP/SOB/palpitations, vaginal bleeding, hematuria/dysuria, constipation/diarrhea. Tolerating regular diet, passing/no flatus. Ambulating. Using incentive spirometry.     Vital Signs Last 24 Hrs  T(C): 36.6 (08 Aug 2021 08:44), Max: 36.6 (07 Aug 2021 21:00)  T(F): 97.9 (08 Aug 2021 08:44), Max: 97.9 (07 Aug 2021 21:00)  HR: 75 (08 Aug 2021 08:44) (68 - 85)  BP: 119/64 (08 Aug 2021 08:44) (100/56 - 119/64)  RR: 20 (08 Aug 2021 08:44) (18 - 20)  SpO2: 99% (08 Aug 2021 08:44) (99% - 100%)      Physical Exam:  General: AAOx3. NAD  CVS: RRR. Nl S1S2  Lungs: CTAB  Abdomen: soft, nontender,  nondistended, +BSx4  Incision: approximately 2cm open with incision tracking 6cm deep and 8cm to the right, packing changed, erythema noted along the skin edge  VE: deferred, no bleeding on pad/chux  Ext: No edema. SCDs in place    Labs:                          12.2   7.08  )-----------( 526      ( 08 Aug 2021 07:09 )             37.6                10.6<L>  7.49  )-----------( 386      ( 08-06 @ 13:00 )             32.6<L>    08-08    136  |  101  |  11  ----------------------------<  101<H>  4.6   |  23  |  0.8    Ca    9.6      08 Aug 2021 07:09  Phos  3.9     08-08  Mg     2.0     08-08    TPro  6.8  /  Alb  3.8  /  TBili  0.3  /  DBili  x   /  AST  8   /  ALT  8   /  AlkPhos  103  08-08       08-06    137  |  104  |  13  ----------------------------<  93  4.9   |  21  |  0.6<L>    Ca    9.2      06 Aug 2021 13:00    TPro  6.5  /  Alb  3.4<L>  /  TBili  <0.2  /  DBili  x   /  AST  29  /  ALT  9   /  AlkPhos  98  08-06            Trend:             10.6<L>  7.49  )-----------( 386      ( 08-06 @ 13:00 )             32.6<L>        Creatinine, Serum: 0.6 (08-06)      MEDICATIONS  (STANDING):  piperacillin/tazobactam IVPB.. 3.375 Gram(s) IV Intermittent every 8 hours  vancomycin  IVPB      vancomycin  IVPB 1250 milliGRAM(s) IV Intermittent every 12 hours    MEDICATIONS  (PRN):  acetaminophen   Tablet .. 650 milliGRAM(s) Oral every 6 hours PRN Temp greater or equal to 38C (100.4F), Moderate Pain (4 - 6)  ibuprofen  Tablet. 600 milliGRAM(s) Oral every 6 hours PRN Moderate Pain (4 - 6)               PGY 3 Note    Patient examined at bedside, pain well controlled on PO medications. Reports improved abdominal pain near her incision. Reports minimal drainage on her dressing. Denies fevers/chills, HA/N/V, CP/SOB/palpitations, vaginal bleeding, hematuria/dysuria, constipation/diarrhea. Tolerating regular diet, passing/no flatus. Ambulating. Using incentive spirometry.     Vital Signs Last 24 Hrs  T(C): 36.6 (08 Aug 2021 08:44), Max: 36.6 (07 Aug 2021 21:00)  T(F): 97.9 (08 Aug 2021 08:44), Max: 97.9 (07 Aug 2021 21:00)  HR: 75 (08 Aug 2021 08:44) (68 - 85)  BP: 119/64 (08 Aug 2021 08:44) (100/56 - 119/64)  RR: 20 (08 Aug 2021 08:44) (18 - 20)  SpO2: 99% (08 Aug 2021 08:44) (99% - 100%)      Physical Exam:  General: AAOx3. NAD  CVS: RRR. Nl S1S2  Lungs: CTAB  Abdomen: soft, nontender,  nondistended, +BSx4  Incision: approximately 2cm open with incision tracking 6cm deep and 8cm to the right, packing changed, erythema noted along the skin edge  VE: deferred, no bleeding on pad/chux  Ext: No edema. SCDs in place    Labs:                          12.2   7.08  )-----------( 526      ( 08 Aug 2021 07:09 )             37.6                10.6<L>  7.49  )-----------( 386      ( 08-06 @ 13:00 )             32.6<L>    08-08    136  |  101  |  11  ----------------------------<  101<H>  4.6   |  23  |  0.8    Ca    9.6      08 Aug 2021 07:09  Phos  3.9     08-08  Mg     2.0     08-08    TPro  6.8  /  Alb  3.8  /  TBili  0.3  /  DBili  x   /  AST  8   /  ALT  8   /  AlkPhos  103  08-08       08-06    137  |  104  |  13  ----------------------------<  93  4.9   |  21  |  0.6<L>    Ca    9.2      06 Aug 2021 13:00    TPro  6.5  /  Alb  3.4<L>  /  TBili  <0.2  /  DBili  x   /  AST  29  /  ALT  9   /  AlkPhos  98  08-06    Lactate, Blood: 2.3: 8/8@0430   Lactate, Blood: 1.2 mmol/L (08.07.21 @ 23:38)   Lactate, Blood: 0.6 mmol/L (08.06.21 @ 13:00)           Trend:             10.6<L>  7.49  )-----------( 386      ( 08-06 @ 13:00 )             32.6<L>        Creatinine, Serum: 0.6 (08-06)      MEDICATIONS  (STANDING):  piperacillin/tazobactam IVPB.. 3.375 Gram(s) IV Intermittent every 8 hours  vancomycin  IVPB      vancomycin  IVPB 1250 milliGRAM(s) IV Intermittent every 12 hours    MEDICATIONS  (PRN):  acetaminophen   Tablet .. 650 milliGRAM(s) Oral every 6 hours PRN Temp greater or equal to 38C (100.4F), Moderate Pain (4 - 6)  ibuprofen  Tablet. 600 milliGRAM(s) Oral every 6 hours PRN Moderate Pain (4 - 6)               PGY 3 Note    Patient examined at bedside, pain well controlled on PO medications. Reports improved abdominal pain near her incision. Reports minimal drainage on her dressing. Denies fevers/chills, HA/N/V, CP/SOB/palpitations, vaginal bleeding, hematuria/dysuria, constipation/diarrhea. Tolerating regular diet, passing/no flatus. Ambulating.     Vital Signs Last 24 Hrs  T(C): 36.6 (08 Aug 2021 08:44), Max: 36.6 (07 Aug 2021 21:00)  T(F): 97.9 (08 Aug 2021 08:44), Max: 97.9 (07 Aug 2021 21:00)  HR: 75 (08 Aug 2021 08:44) (68 - 85)  BP: 119/64 (08 Aug 2021 08:44) (100/56 - 119/64)  RR: 20 (08 Aug 2021 08:44) (18 - 20)  SpO2: 99% (08 Aug 2021 08:44) (99% - 100%)      Physical Exam:  General: AAOx3. NAD  CVS: RRR. Nl S1S2  Lungs: CTAB  Abdomen: soft, nontender,  nondistended, +BSx4  Incision: approximately 2cm open with incision tracking 6cm deep and 8cm to the right, packing changed, erythema noted along the skin edge  VE: deferred, no bleeding on pad/chux  Ext: No edema. SCDs in place    Labs:                          12.2   7.08  )-----------( 526      ( 08 Aug 2021 07:09 )             37.6                10.6<L>  7.49  )-----------( 386      ( 08-06 @ 13:00 )             32.6<L>    08-08    136  |  101  |  11  ----------------------------<  101<H>  4.6   |  23  |  0.8    Ca    9.6      08 Aug 2021 07:09  Phos  3.9     08-08  Mg     2.0     08-08    TPro  6.8  /  Alb  3.8  /  TBili  0.3  /  DBili  x   /  AST  8   /  ALT  8   /  AlkPhos  103  08-08       08-06    137  |  104  |  13  ----------------------------<  93  4.9   |  21  |  0.6<L>    Ca    9.2      06 Aug 2021 13:00    TPro  6.5  /  Alb  3.4<L>  /  TBili  <0.2  /  DBili  x   /  AST  29  /  ALT  9   /  AlkPhos  98  08-06    Lactate, Blood: 2.3: 8/8@0430   Lactate, Blood: 1.2 mmol/L (08.07.21 @ 23:38)   Lactate, Blood: 0.6 mmol/L (08.06.21 @ 13:00)           Trend:             10.6<L>  7.49  )-----------( 386      ( 08-06 @ 13:00 )             32.6<L>        Creatinine, Serum: 0.6 (08-06)      MEDICATIONS  (STANDING):  piperacillin/tazobactam IVPB.. 3.375 Gram(s) IV Intermittent every 8 hours  vancomycin  IVPB      vancomycin  IVPB 1250 milliGRAM(s) IV Intermittent every 12 hours    MEDICATIONS  (PRN):  acetaminophen   Tablet .. 650 milliGRAM(s) Oral every 6 hours PRN Temp greater or equal to 38C (100.4F), Moderate Pain (4 - 6)  ibuprofen  Tablet. 600 milliGRAM(s) Oral every 6 hours PRN Moderate Pain (4 - 6)               PGY 3 Note    Patient examined at bedside, pain well controlled on PO medications. Reports improved abdominal pain near her incision. Reports minimal drainage on her dressing. Denies fevers/chills, HA/N/V, CP/SOB/palpitations, vaginal bleeding, hematuria/dysuria, constipation/diarrhea. Tolerating regular diet, passing flatus, voiding, and ambulating without difficulty.      Vital Signs Last 24 Hrs  T(C): 36.6 (08 Aug 2021 08:44), Max: 36.6 (07 Aug 2021 21:00)  T(F): 97.9 (08 Aug 2021 08:44), Max: 97.9 (07 Aug 2021 21:00)  HR: 75 (08 Aug 2021 08:44) (68 - 85)  BP: 119/64 (08 Aug 2021 08:44) (100/56 - 119/64)  RR: 20 (08 Aug 2021 08:44) (18 - 20)  SpO2: 99% (08 Aug 2021 08:44) (99% - 100%)      Physical Exam:  General: AAOx3. NAD  CVS: RRR. Nl S1S2  Lungs: CTAB  Abdomen: soft, nontender,  nondistended, +BSx4  Incision: approximately 2cm open with incision tracking 8cm deep and 6cm to the right, packing changed, erythema noted along the skin edge  VE: deferred, no bleeding on pad/chux  Ext: No edema. SCDs in place    Labs:                          12.2   7.08  )-----------( 526      ( 08 Aug 2021 07:09 )             37.6                10.6<L>  7.49  )-----------( 386      ( 08-06 @ 13:00 )             32.6<L>    08-08    136  |  101  |  11  ----------------------------<  101<H>  4.6   |  23  |  0.8    Ca    9.6      08 Aug 2021 07:09  Phos  3.9     08-08  Mg     2.0     08-08    TPro  6.8  /  Alb  3.8  /  TBili  0.3  /  DBili  x   /  AST  8   /  ALT  8   /  AlkPhos  103  08-08       08-06    137  |  104  |  13  ----------------------------<  93  4.9   |  21  |  0.6<L>    Ca    9.2      06 Aug 2021 13:00    TPro  6.5  /  Alb  3.4<L>  /  TBili  <0.2  /  DBili  x   /  AST  29  /  ALT  9   /  AlkPhos  98  08-06    Lactate, Blood: 2.3: 8/8@0430   Lactate, Blood: 1.2 mmol/L (08.07.21 @ 23:38)   Lactate, Blood: 0.6 mmol/L (08.06.21 @ 13:00)           Trend:             10.6<L>  7.49  )-----------( 386      ( 08-06 @ 13:00 )             32.6<L>        Creatinine, Serum: 0.6 (08-06)      MEDICATIONS  (STANDING):  piperacillin/tazobactam IVPB.. 3.375 Gram(s) IV Intermittent every 8 hours  vancomycin  IVPB      vancomycin  IVPB 1250 milliGRAM(s) IV Intermittent every 12 hours    MEDICATIONS  (PRN):  acetaminophen   Tablet .. 650 milliGRAM(s) Oral every 6 hours PRN Temp greater or equal to 38C (100.4F), Moderate Pain (4 - 6)  ibuprofen  Tablet. 600 milliGRAM(s) Oral every 6 hours PRN Moderate Pain (4 - 6)

## 2021-08-08 NOTE — PROGRESS NOTE ADULT - ASSESSMENT
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, currently clinically and hemodynamically stable    - Lactate up-trending, patient's physical exam and vital signs reassuring, likely not indicative of sepsis, will continue to trend  - Zosyn 3.375g q6hrs + vancomycin 20mg/kg q8 hrs - follow Vanc trough  - diet: regular   - DVT ppx: SCDs  - monitor temps and vitals  - pain management prn   - f/u bcx x2  - packing change daily  - consider repeating CT scan on Monday   - daily AM labs  - continue IVF hydration   - activity: ambulate as tolerated  - encourage ambulation  - encourage PO hydration   - Patient to be evaluated in AM for possible surgical exploration of wound  - ID consult placed for uptrending lactate and Abx regimen    Dr. Coppola aware

## 2021-08-09 ENCOUNTER — TRANSCRIPTION ENCOUNTER (OUTPATIENT)
Age: 36
End: 2021-08-09

## 2021-08-09 DIAGNOSIS — Z02.9 ENCOUNTER FOR ADMINISTRATIVE EXAMINATIONS, UNSPECIFIED: ICD-10-CM

## 2021-08-09 LAB
ALBUMIN SERPL ELPH-MCNC: 3.5 G/DL — SIGNIFICANT CHANGE UP (ref 3.5–5.2)
ALP SERPL-CCNC: 87 U/L — SIGNIFICANT CHANGE UP (ref 30–115)
ALT FLD-CCNC: 7 U/L — SIGNIFICANT CHANGE UP (ref 0–41)
ANION GAP SERPL CALC-SCNC: 10 MMOL/L — SIGNIFICANT CHANGE UP (ref 7–14)
AST SERPL-CCNC: 8 U/L — SIGNIFICANT CHANGE UP (ref 0–41)
BASOPHILS # BLD AUTO: 0.05 K/UL — SIGNIFICANT CHANGE UP (ref 0–0.2)
BASOPHILS NFR BLD AUTO: 0.7 % — SIGNIFICANT CHANGE UP (ref 0–1)
BILIRUB SERPL-MCNC: 0.3 MG/DL — SIGNIFICANT CHANGE UP (ref 0.2–1.2)
BUN SERPL-MCNC: 15 MG/DL — SIGNIFICANT CHANGE UP (ref 10–20)
CALCIUM SERPL-MCNC: 9.1 MG/DL — SIGNIFICANT CHANGE UP (ref 8.5–10.1)
CHLORIDE SERPL-SCNC: 104 MMOL/L — SIGNIFICANT CHANGE UP (ref 98–110)
CO2 SERPL-SCNC: 23 MMOL/L — SIGNIFICANT CHANGE UP (ref 17–32)
CREAT SERPL-MCNC: 0.8 MG/DL — SIGNIFICANT CHANGE UP (ref 0.7–1.5)
CRP SERPL-MCNC: 13 MG/L — HIGH
EOSINOPHIL # BLD AUTO: 0.2 K/UL — SIGNIFICANT CHANGE UP (ref 0–0.7)
EOSINOPHIL NFR BLD AUTO: 2.8 % — SIGNIFICANT CHANGE UP (ref 0–8)
GLUCOSE SERPL-MCNC: 101 MG/DL — HIGH (ref 70–99)
HCT VFR BLD CALC: 34.5 % — LOW (ref 37–47)
HGB BLD-MCNC: 11.2 G/DL — LOW (ref 12–16)
IMM GRANULOCYTES NFR BLD AUTO: 0.8 % — HIGH (ref 0.1–0.3)
LACTATE SERPL-SCNC: 1 MMOL/L — SIGNIFICANT CHANGE UP (ref 0.7–2)
LYMPHOCYTES # BLD AUTO: 1.48 K/UL — SIGNIFICANT CHANGE UP (ref 1.2–3.4)
LYMPHOCYTES # BLD AUTO: 20.9 % — SIGNIFICANT CHANGE UP (ref 20.5–51.1)
MCHC RBC-ENTMCNC: 27.5 PG — SIGNIFICANT CHANGE UP (ref 27–31)
MCHC RBC-ENTMCNC: 32.5 G/DL — SIGNIFICANT CHANGE UP (ref 32–37)
MCV RBC AUTO: 84.6 FL — SIGNIFICANT CHANGE UP (ref 81–99)
MONOCYTES # BLD AUTO: 0.63 K/UL — HIGH (ref 0.1–0.6)
MONOCYTES NFR BLD AUTO: 8.9 % — SIGNIFICANT CHANGE UP (ref 1.7–9.3)
NEUTROPHILS # BLD AUTO: 4.67 K/UL — SIGNIFICANT CHANGE UP (ref 1.4–6.5)
NEUTROPHILS NFR BLD AUTO: 65.9 % — SIGNIFICANT CHANGE UP (ref 42.2–75.2)
NRBC # BLD: 0 /100 WBCS — SIGNIFICANT CHANGE UP (ref 0–0)
PLATELET # BLD AUTO: 436 K/UL — HIGH (ref 130–400)
POTASSIUM SERPL-MCNC: 4.1 MMOL/L — SIGNIFICANT CHANGE UP (ref 3.5–5)
POTASSIUM SERPL-SCNC: 4.1 MMOL/L — SIGNIFICANT CHANGE UP (ref 3.5–5)
PROT SERPL-MCNC: 6.2 G/DL — SIGNIFICANT CHANGE UP (ref 6–8)
RBC # BLD: 4.08 M/UL — LOW (ref 4.2–5.4)
RBC # FLD: 13.2 % — SIGNIFICANT CHANGE UP (ref 11.5–14.5)
SODIUM SERPL-SCNC: 137 MMOL/L — SIGNIFICANT CHANGE UP (ref 135–146)
VANCOMYCIN TROUGH SERPL-MCNC: 17 UG/ML — HIGH (ref 5–10)
WBC # BLD: 7.09 K/UL — SIGNIFICANT CHANGE UP (ref 4.8–10.8)
WBC # FLD AUTO: 7.09 K/UL — SIGNIFICANT CHANGE UP (ref 4.8–10.8)

## 2021-08-09 PROCEDURE — 99232 SBSQ HOSP IP/OBS MODERATE 35: CPT

## 2021-08-09 PROCEDURE — 74177 CT ABD & PELVIS W/CONTRAST: CPT | Mod: 26

## 2021-08-09 RX ORDER — ACETAMINOPHEN 500 MG
2 TABLET ORAL
Qty: 0 | Refills: 0 | DISCHARGE
Start: 2021-08-09

## 2021-08-09 RX ORDER — IBUPROFEN 200 MG
1 TABLET ORAL
Qty: 0 | Refills: 0 | DISCHARGE
Start: 2021-08-09

## 2021-08-09 RX ADMIN — PIPERACILLIN AND TAZOBACTAM 25 GRAM(S): 4; .5 INJECTION, POWDER, LYOPHILIZED, FOR SOLUTION INTRAVENOUS at 15:47

## 2021-08-09 RX ADMIN — PIPERACILLIN AND TAZOBACTAM 25 GRAM(S): 4; .5 INJECTION, POWDER, LYOPHILIZED, FOR SOLUTION INTRAVENOUS at 21:44

## 2021-08-09 RX ADMIN — Medication 166.67 MILLIGRAM(S): at 05:10

## 2021-08-09 RX ADMIN — PIPERACILLIN AND TAZOBACTAM 25 GRAM(S): 4; .5 INJECTION, POWDER, LYOPHILIZED, FOR SOLUTION INTRAVENOUS at 09:18

## 2021-08-09 RX ADMIN — Medication 166.67 MILLIGRAM(S): at 19:21

## 2021-08-09 NOTE — PROGRESS NOTE ADULT - ASSESSMENT
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, currently clinically and hemodynamically stable  - f/u CT scan today  - Lactate up-trending, patient's physical exam and vital signs reassuring, likely not indicative of sepsis, will continue to trend  - Zosyn 3.375g q6hrs + vancomycin 20mg/kg q8 hrs   - diet: regular   - DVT ppx: SCDs  - monitor temps and vitals  - pain management prn   - f/u bcx x2  - packing change daily- packing removed at this time for imaging  - daily AM labs  - continue IVF hydration   - activity: ambulate as tolerated  - encourage ambulation  - encourage PO hydration   - Patient to be evaluated in AM for possible surgical exploration of wound  - ID consult placed for uptrending lactate and Abx regimen    Dr. 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, currently clinically and hemodynamically stable  - f/u CT scan today  - Lactate up-trending, patient's physical exam and vital signs reassuring, likely not indicative of sepsis, will continue to trend  - Zosyn 3.375g q6hrs + vancomycin 20mg/kg q8 hrs   - diet: NPO at this time, resume regular diet once CT scan is done  - DVT ppx: SCDs  - monitor temps and vitals  - pain management prn   - f/u bcx x2  - packing change daily- packing removed at this time for imaging  - daily AM labs  - continue IVF hydration   - activity: ambulate as tolerated  - encourage ambulation  - encourage PO hydration   - Patient to be evaluated in AM for possible surgical exploration of wound  - ID consult placed for uptrending lactate and Abx regimen    Dr. 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, currently clinically and hemodynamically stable  - f/u repeat CT scan today  - Zosyn 3.375g q6hrs + vancomycin 20mg/kg q8 hrs   - diet: NPO at this time, resume regular diet once CT scan is done  - DVT ppx: SCDs  - monitor temps and vitals  - pain management prn   - f/u bcx x2  - packing change daily- packing removed at this time for imaging  - daily AM labs  - continue IVF hydration   - activity: ambulate as tolerated  - encourage ambulation  - encourage PO hydration   - Patient to be evaluated in AM for possible surgical exploration of wound  - ID consult placed for uptrending lactate and Abx regimen    Dr. Fernández and OBGYN attending to be made aware

## 2021-08-09 NOTE — CONSULT NOTE ADULT - SUBJECTIVE AND OBJECTIVE BOX
AUDREY BANERJEE  35y, Female  Allergy: No Known Allergies      CHIEF COMPLAINT: wound infection (09 Aug 2021 06:15)      HPI:  36yo  Female with a past medical history of repeat C section on , and uterine fibroids. Patient is comfortable and reports no current symptoms. Reported that she had some subjective fever and headache 1.5 weeks ago. They report no pain at the incision site.    Infectious Diseases History:  Old Micro Data/Cultures:     FAMILY HISTORY:  No pertinent family history in first degree relatives      PAST MEDICAL & SURGICAL HISTORY:  No pertinent past medical history    History of  section    H/O myomectomy        SOCIAL HISTORY  Social History:  Smoking - Denies  Alcohol use- Denies  Illicit drug use - Denies (06 Aug 2021 21:59)      Recent Travel:  Other Exposures:     ROS  General: Denies rigors, nightsweats  HEENT: Denies headache, rhinorrhea, sore throat, eye pain  CV: Denies CP, palpitations  PULM: Denies wheezing, hemoptysis, SoB  GI: Denies hematemesis, hematochezia, melena  : see HPI  MSK: Denies arthralgias, myalgias  SKIN: Denies rash, lesions  NEURO: Denies paresthesias, weakness  PSYCH: Denies depression, anxiety    VITALS:  T(F): 97.6, Max: 97.6 (21 @ 13:07)  HR: 80  BP: 123/68  RR: 20Vital Signs Last 24 Hrs  T(C): 36.4 (09 Aug 2021 09:09), Max: 36.4 (08 Aug 2021 13:07)  T(F): 97.6 (09 Aug 2021 09:09), Max: 97.6 (08 Aug 2021 13:07)  HR: 80 (09 Aug 2021 09:09) (61 - 96)  BP: 123/68 (09 Aug 2021 09:09) (98/53 - 123/68)  BP(mean): --  RR: 20 (09 Aug 2021 09:09) (18 - 20)  SpO2: 100% (09 Aug 2021 09:09) (98% - 100%)    PHYSICAL EXAM:  Gen: NAD, resting in bed  HEENT: Normocephalic, atraumatic  Neck: supple, no lymphadenopathy  CV: Regular rate & regular rhythm  Lungs: CTAB  Abdomen: Soft, Pfannenstiel incision is intact, with mild erythema on margins  Ext: Warm, well perfused  Neuro: non focal, awake, alert and oriented  Skin: no rash, no lesions  Lines: no phlebitis    TESTS & MEASUREMENTS:                        11.2   7.09  )-----------( 436      ( 09 Aug 2021 05:51 )             34.5         137  |  104  |  15  ----------------------------<  101<H>  4.1   |  23  |  0.8    Ca    9.1      09 Aug 2021 05:51  Phos  3.9       Mg     2.0         TPro  6.2  /  Alb  3.5  /  TBili  0.3  /  DBili  x   /  AST  8   /  ALT  7   /  AlkPhos  87      eGFR if Non African American: 96 mL/min/1.73M2 (21 @ 05:51)  eGFR if : 111 mL/min/1.73M2 (21 @ 05:51)    LIVER FUNCTIONS - ( 09 Aug 2021 05:51 )  Alb: 3.5 g/dL / Pro: 6.2 g/dL / ALK PHOS: 87 U/L / ALT: 7 U/L / AST: 8 U/L / GGT: x               Culture - Blood (collected 21 @ 21:17)  Source: .Blood Blood  Preliminary Report (21 @ 08:01):    No growth to date.    Culture - Blood (collected 21 @ 21:17)  Source: .Blood Blood  Preliminary Report (21 @ 08:01):    No growth to date.        Lactate, Blood: 1.0 mmol/L (21 @ 05:51)  Lactate, Blood: 2.3 mmol/L (21 @ 07:09)  Lactate, Blood: 1.2 mmol/L (21 @ 23:38)  Lactate, Blood: 0.6 mmol/L (21 @ 13:00)      INFECTIOUS DISEASES TESTING      RADIOLOGY & ADDITIONAL TESTS:  I have personally reviewed the last available Chest xray  CXR      CT  CT Abdomen and Pelvis w/ IV Cont:   EXAM:  CT ABDOMEN AND PELVIS IC            PROCEDURE DATE:  2021            INTERPRETATION:  CLINICAL STATEMENT: Follow-up uterocutaneous fistula.    TECHNIQUE: Contiguous axial CT images were obtained from the lower chest to the pubic symphysis following administration of 100cc Optiray 320 intravenous contrast.  Oral contrast was not administered.  Reformatted images in the coronal and sagittal planes were acquired.    COMPARISON CT: 2021.    FINDINGS:    LOWER CHEST: Unchanged.    HEPATOBILIARY: Unchanged.    SPLEEN: Unchanged..    PANCREAS: Unchanged.    ADRENAL GLANDS: Unchanged.    KIDNEYS: Unchanged.    ABDOMINOPELVIC NODES: Unchanged.    PELVIC ORGANS: No significant change in a 4.4 x 1.3 x 3.6 cm rim-enhancing fluid collection extending from the endometrial cavity to the anterior pelvic soft tissues, with several internal punctate foci of gas (7/40.) Foci of gas are redemonstrated within the vaginal canal.    PERITONEUM/MESENTERY/BOWEL: Unchanged.    BONES/SOFT TISSUES: Unchanged soft tissue air and inflammatory changes along the Pfannenstiel incision site.      IMPRESSION:    1. Since 2021, overall stable exam, with no significant change in a 4.4 cm rim-enhancing fluid collection extending from theendometrial cavity to the anterior pelvic soft tissues, with several internal punctate foci of gas.    --- End of Report ---              PATRIZIA HENSLEY MD; Attending Radiologist  This document has been electronically signed. Aug  9 2021  8:16AM (21 @ 07:56)  CT Abdomen and Pelvis w/ IV Cont:   EXAM:  CT ABDOMEN AND PELVIS IC            PROCEDURE DATE:  2021            INTERPRETATION:  CLINICAL STATEMENT: Abdominal pain. Status post  with purulent drainage from incision site.    TECHNIQUE: Contiguous axial CT images were obtained from the lower chest to the pubic symphysis following administration of 95 cc Omnipaque 350 intravenous contrast.  Oral contrast was not administered.  Reformatted images in the coronal and sagittal planes were acquired.    COMPARISON CT: None.    FINDINGS:    LOWER CHEST: Unremarkable.    HEPATOBILIARY: The hepatic parenchyma is unremarkable. No intrahepatic or extra hepatic biliary ductal dilatation. The gallbladder is present.    SPLEEN: Unremarkable.    PANCREAS: Unremarkable.    ADRENAL GLANDS: Unremarkable.    KIDNEYS: Symmetric nephrogram. No hydronephrosis or obstructing renal calculus.    ABDOMINOPELVIC NODES: Prominent inguinal lymph nodes, likely reactive.    PELVIC ORGANS: 4.8 x 1.4 x 3.6 cm rim enhancing fluid collection extending from the endometrial cavity into the ventral abdominopelvic soft tissues. Gas is noted in the vaginal canal.    PERITONEUM/MESENTERY/BOWEL: No evidence of bowel obstruction, pneumoperitoneum, or ascites.    BONES/SOFT TISSUES: Soft tissue gas and inflammatory change at the ventral abdominopelvic incision site. Diastases of the anterior abdominal wall.    OTHER: Normal caliber aorta.      IMPRESSION:    4.8 cm abscess extending from the endometrial cavity into the ventral abdominopelvic soft tissues at the patient's  incision site with surrounding inflammatory change and foci of soft tissue gas.      Dr. Gigi Ferrer discussed preliminary findings with ELISA GOLDMAN MD on 2021 8:27 PM with readback.    --- End of Report ---            GIGI FERRER MD; Resident Radiologist  This document has been electronically signed.  EMRE BERNARD MD; Attending Radiologist  This document has been electronically signed. Aug  6 2021  8:51PM (21 @ 19:29)      CARDIOLOGY TESTING      All available historical records have been reviewed    MEDICATIONS  lactated ringers. 1000  piperacillin/tazobactam IVPB.. 3.375  vancomycin  IVPB   vancomycin  IVPB 1250      ANTIBIOTICS:  piperacillin/tazobactam IVPB.. 3.375 Gram(s) IV Intermittent every 8 hours  vancomycin  IVPB      vancomycin  IVPB 1250 milliGRAM(s) IV Intermittent every 12 hours      All available historical data has been reviewed    ASSESSMENT  36yo  Female with a PMH of fibroids and repeat  on . 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 All final recommendations to follow pending discussion with ID Attending    AUDREY BANERJEE  35y, Female  Allergy: No Known Allergies      CHIEF COMPLAINT: wound infection (09 Aug 2021 06:15)      HPI:  36yo  Female with a past medical history of repeat C section on , and uterine fibroids. Patient is comfortable and reports no current symptoms. Reported that she had some subjective fever and headache 1.5 weeks ago but now feels fine. They report no pain at the incision site or abdominal tenderness.    Infectious Diseases History:  Old Micro Data/Cultures:     FAMILY HISTORY:  No pertinent family history in first degree relatives      PAST MEDICAL & SURGICAL HISTORY:  No pertinent past medical history    History of  section    H/O myomectomy        SOCIAL HISTORY  Social History:  Smoking - Denies  Alcohol use- Denies  Illicit drug use - Denies (06 Aug 2021 21:59)      Recent Travel:  Other Exposures:     ROS  General: Denies rigors, nightsweats  HEENT: Denies headache, rhinorrhea, sore throat, eye pain  CV: Denies CP, palpitations  PULM: Denies wheezing, hemoptysis, SoB  GI: Denies hematemesis, hematochezia, melena  : see HPI  MSK: Denies arthralgias, myalgias  SKIN: Denies rash, lesions  NEURO: Denies paresthesias, weakness  PSYCH: Denies depression, anxiety    VITALS:  T(F): 97.6, Max: 97.6 (21 @ 13:07)  HR: 80  BP: 123/68  RR: 20Vital Signs Last 24 Hrs  T(C): 36.4 (09 Aug 2021 09:09), Max: 36.4 (08 Aug 2021 13:07)  T(F): 97.6 (09 Aug 2021 09:09), Max: 97.6 (08 Aug 2021 13:07)  HR: 80 (09 Aug 2021 09:09) (61 - 96)  BP: 123/68 (09 Aug 2021 09:09) (98/53 - 123/68)  BP(mean): --  RR: 20 (09 Aug 2021 09:09) (18 - 20)  SpO2: 100% (09 Aug 2021 09:09) (98% - 100%)    PHYSICAL EXAM:  Gen: NAD, resting in bed  HEENT: Normocephalic, atraumatic  Neck: supple, no lymphadenopathy  CV: Regular rate & regular rhythm  Lungs: CTAB  Abdomen: Soft, Pfannenstiel incision is intact, with mild erythema on margins  Ext: Warm, well perfused  Neuro: non focal, awake, alert and oriented  Skin: no rash, no lesions  Lines: no phlebitis    TESTS & MEASUREMENTS:                        11.2   7.09  )-----------( 436      ( 09 Aug 2021 05:51 )             34.5         137  |  104  |  15  ----------------------------<  101<H>  4.1   |  23  |  0.8    Ca    9.1      09 Aug 2021 05:51  Phos  3.9       Mg     2.0         TPro  6.2  /  Alb  3.5  /  TBili  0.3  /  DBili  x   /  AST  8   /  ALT  7   /  AlkPhos  87      eGFR if Non African American: 96 mL/min/1.73M2 (21 @ 05:51)  eGFR if : 111 mL/min/1.73M2 (21 @ 05:51)    LIVER FUNCTIONS - ( 09 Aug 2021 05:51 )  Alb: 3.5 g/dL / Pro: 6.2 g/dL / ALK PHOS: 87 U/L / ALT: 7 U/L / AST: 8 U/L / GGT: x               Culture - Blood (collected 21 @ 21:17)  Source: .Blood Blood  Preliminary Report (21 @ 08:01):    No growth to date.    Culture - Blood (collected 21 @ 21:17)  Source: .Blood Blood  Preliminary Report (21 @ 08:01):    No growth to date.        Lactate, Blood: 1.0 mmol/L (21 @ 05:51)  Lactate, Blood: 2.3 mmol/L (21 @ 07:09)  Lactate, Blood: 1.2 mmol/L (21 @ 23:38)  Lactate, Blood: 0.6 mmol/L (21 @ 13:00)      INFECTIOUS DISEASES TESTING      RADIOLOGY & ADDITIONAL TESTS:  I have personally reviewed the last available Chest xray  CXR      CT  CT Abdomen and Pelvis w/ IV Cont:   EXAM:  CT ABDOMEN AND PELVIS IC            PROCEDURE DATE:  2021            INTERPRETATION:  CLINICAL STATEMENT: Follow-up uterocutaneous fistula.    TECHNIQUE: Contiguous axial CT images were obtained from the lower chest to the pubic symphysis following administration of 100cc Optiray 320 intravenous contrast.  Oral contrast was not administered.  Reformatted images in the coronal and sagittal planes were acquired.    COMPARISON CT: 2021.    FINDINGS:    LOWER CHEST: Unchanged.    HEPATOBILIARY: Unchanged.    SPLEEN: Unchanged..    PANCREAS: Unchanged.    ADRENAL GLANDS: Unchanged.    KIDNEYS: Unchanged.    ABDOMINOPELVIC NODES: Unchanged.    PELVIC ORGANS: No significant change in a 4.4 x 1.3 x 3.6 cm rim-enhancing fluid collection extending from the endometrial cavity to the anterior pelvic soft tissues, with several internal punctate foci of gas (7/40.) Foci of gas are redemonstrated within the vaginal canal.    PERITONEUM/MESENTERY/BOWEL: Unchanged.    BONES/SOFT TISSUES: Unchanged soft tissue air and inflammatory changes along the Pfannenstiel incision site.      IMPRESSION:    1. Since 2021, overall stable exam, with no significant change in a 4.4 cm rim-enhancing fluid collection extending from theendometrial cavity to the anterior pelvic soft tissues, with several internal punctate foci of gas.    --- End of Report ---              PATRIZIA HENSLEY MD; Attending Radiologist  This document has been electronically signed. Aug  9 2021  8:16AM (21 @ 07:56)  CT Abdomen and Pelvis w/ IV Cont:   EXAM:  CT ABDOMEN AND PELVIS IC            PROCEDURE DATE:  2021            INTERPRETATION:  CLINICAL STATEMENT: Abdominal pain. Status post  with purulent drainage from incision site.    TECHNIQUE: Contiguous axial CT images were obtained from the lower chest to the pubic symphysis following administration of 95 cc Omnipaque 350 intravenous contrast.  Oral contrast was not administered.  Reformatted images in the coronal and sagittal planes were acquired.    COMPARISON CT: None.    FINDINGS:    LOWER CHEST: Unremarkable.    HEPATOBILIARY: The hepatic parenchyma is unremarkable. No intrahepatic or extra hepatic biliary ductal dilatation. The gallbladder is present.    SPLEEN: Unremarkable.    PANCREAS: Unremarkable.    ADRENAL GLANDS: Unremarkable.    KIDNEYS: Symmetric nephrogram. No hydronephrosis or obstructing renal calculus.    ABDOMINOPELVIC NODES: Prominent inguinal lymph nodes, likely reactive.    PELVIC ORGANS: 4.8 x 1.4 x 3.6 cm rim enhancing fluid collection extending from the endometrial cavity into the ventral abdominopelvic soft tissues. Gas is noted in the vaginal canal.    PERITONEUM/MESENTERY/BOWEL: No evidence of bowel obstruction, pneumoperitoneum, or ascites.    BONES/SOFT TISSUES: Soft tissue gas and inflammatory change at the ventral abdominopelvic incision site. Diastases of the anterior abdominal wall.    OTHER: Normal caliber aorta.      IMPRESSION:    4.8 cm abscess extending from the endometrial cavity into the ventral abdominopelvic soft tissues at the patient's  incision site with surrounding inflammatory change and foci of soft tissue gas.      Dr. Gigi Ferrer discussed preliminary findings with ELISA GOLDMAN MD on 2021 8:27 PM with readback.    --- End of Report ---            GIGI FERRER MD; Resident Radiologist  This document has been electronically signed.  EMRE BERNARD MD; Attending Radiologist  This document has been electronically signed. Aug  6 2021  8:51PM (21 @ 19:29)      CARDIOLOGY TESTING      All available historical records have been reviewed    MEDICATIONS  lactated ringers. 1000  piperacillin/tazobactam IVPB.. 3.375  vancomycin  IVPB   vancomycin  IVPB 1250      ANTIBIOTICS:  piperacillin/tazobactam IVPB.. 3.375 Gram(s) IV Intermittent every 8 hours  vancomycin  IVPB      vancomycin  IVPB 1250 milliGRAM(s) IV Intermittent every 12 hours      All available historical data has been reviewed    ASSESSMENT  36yo  Female with a PMH of fibroids and repeat  on  presented with discharge from incision site. Patient appears comfortable and clinically stable. Blood cultures are negative as of . Abscess size of 4.4cm per CT is unchanged after 3 days of IV antibiotics.     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    -Cannot start PO antibiotics as abscess is >2cm      RECOMMENDATIONS  - f/u pending cultures  - Antibiotics: Continue Vancomycin 1250mg q12 and Zosyn 3.375g q8  - CRP  - New vanc trough level  - MRSA nares swab  - Drainage of abscess by Ob/gyn ex lap or IR procedure is indicated, abscess size has not changed on antibiotic therapy  - If drainage is refused, patient will need a mid line placed to continue IV antibiotics at home.     This is a pended note. All final recommendations to follow pending discussion with ID Attending    AUDREY BANERJEE  35y, Female  Allergy: No Known Allergies      CHIEF COMPLAINT: wound infection (09 Aug 2021 06:15)      HPI:  34yo  Female with a past medical history of repeat C section on , and uterine fibroids. Patient is comfortable and reports no current symptoms. Reported that she had some subjective fever and headache 1.5 weeks ago but now feels fine. They report no pain at the incision site or abdominal tenderness.    Infectious Diseases History:  Old Micro Data/Cultures: NA    FAMILY HISTORY:  No pertinent family history in first degree relatives      PAST MEDICAL & SURGICAL HISTORY:  No pertinent past medical history    History of  section    H/O myomectomy        SOCIAL HISTORY  Social History:  Smoking - Denies  Alcohol use- Denies  Illicit drug use - Denies (06 Aug 2021 21:59)      Recent Travel:  Other Exposures:     ROS  General: Denies rigors, nightsweats  HEENT: Denies headache, rhinorrhea, sore throat, eye pain  CV: Denies CP, palpitations  PULM: Denies wheezing, hemoptysis, SoB  GI: Denies hematemesis, hematochezia, melena  : see HPI  MSK: Denies arthralgias, myalgias  SKIN: Denies rash, lesions  NEURO: Denies paresthesias, weakness  PSYCH: Denies depression, anxiety    VITALS:  T(F): 97.6, Max: 97.6 (21 @ 13:07)  HR: 80  BP: 123/68  RR: 20Vital Signs Last 24 Hrs  T(C): 36.4 (09 Aug 2021 09:09), Max: 36.4 (08 Aug 2021 13:07)  T(F): 97.6 (09 Aug 2021 09:09), Max: 97.6 (08 Aug 2021 13:07)  HR: 80 (09 Aug 2021 09:09) (61 - 96)  BP: 123/68 (09 Aug 2021 09:09) (98/53 - 123/68)  BP(mean): --  RR: 20 (09 Aug 2021 09:09) (18 - 20)  SpO2: 100% (09 Aug 2021 09:09) (98% - 100%)    PHYSICAL EXAM:  Gen: NAD, resting in bed  HEENT: Normocephalic, atraumatic  Neck: supple, no lymphadenopathy  CV: Regular rate & regular rhythm  Lungs: CTAB  Abdomen: Soft, Pfannenstiel incision is intact, with mild erythema on margins  Ext: Warm, well perfused  Neuro: non focal, awake, alert and oriented  Skin: no rash, no lesions  Lines: no phlebitis    TESTS & MEASUREMENTS:                        11.2   7.09  )-----------( 436      ( 09 Aug 2021 05:51 )             34.5         137  |  104  |  15  ----------------------------<  101<H>  4.1   |  23  |  0.8    Ca    9.1      09 Aug 2021 05:51  Phos  3.9       Mg     2.0         TPro  6.2  /  Alb  3.5  /  TBili  0.3  /  DBili  x   /  AST  8   /  ALT  7   /  AlkPhos  87      eGFR if Non African American: 96 mL/min/1.73M2 (21 @ 05:51)  eGFR if : 111 mL/min/1.73M2 (21 @ 05:51)    LIVER FUNCTIONS - ( 09 Aug 2021 05:51 )  Alb: 3.5 g/dL / Pro: 6.2 g/dL / ALK PHOS: 87 U/L / ALT: 7 U/L / AST: 8 U/L / GGT: x               Culture - Blood (collected 21 @ 21:17)  Source: .Blood Blood  Preliminary Report (21 @ 08:01):    No growth to date.    Culture - Blood (collected 21 @ 21:17)  Source: .Blood Blood  Preliminary Report (21 @ 08:01):    No growth to date.        Lactate, Blood: 1.0 mmol/L (21 @ 05:51)  Lactate, Blood: 2.3 mmol/L (21 @ 07:09)  Lactate, Blood: 1.2 mmol/L (21 @ 23:38)  Lactate, Blood: 0.6 mmol/L (21 @ 13:00)      INFECTIOUS DISEASES TESTING      RADIOLOGY & ADDITIONAL TESTS:  I have personally reviewed the last available Chest xray  CXR      CT  CT Abdomen and Pelvis w/ IV Cont:   EXAM:  CT ABDOMEN AND PELVIS IC            PROCEDURE DATE:  2021            INTERPRETATION:  CLINICAL STATEMENT: Follow-up uterocutaneous fistula.    TECHNIQUE: Contiguous axial CT images were obtained from the lower chest to the pubic symphysis following administration of 100cc Optiray 320 intravenous contrast.  Oral contrast was not administered.  Reformatted images in the coronal and sagittal planes were acquired.    COMPARISON CT: 2021.    FINDINGS:    LOWER CHEST: Unchanged.    HEPATOBILIARY: Unchanged.    SPLEEN: Unchanged..    PANCREAS: Unchanged.    ADRENAL GLANDS: Unchanged.    KIDNEYS: Unchanged.    ABDOMINOPELVIC NODES: Unchanged.    PELVIC ORGANS: No significant change in a 4.4 x 1.3 x 3.6 cm rim-enhancing fluid collection extending from the endometrial cavity to the anterior pelvic soft tissues, with several internal punctate foci of gas (7/40.) Foci of gas are redemonstrated within the vaginal canal.    PERITONEUM/MESENTERY/BOWEL: Unchanged.    BONES/SOFT TISSUES: Unchanged soft tissue air and inflammatory changes along the Pfannenstiel incision site.      IMPRESSION:    1. Since 2021, overall stable exam, with no significant change in a 4.4 cm rim-enhancing fluid collection extending from theendometrial cavity to the anterior pelvic soft tissues, with several internal punctate foci of gas.    --- End of Report ---              PATRIZIA HENSLEY MD; Attending Radiologist  This document has been electronically signed. Aug  9 2021  8:16AM (21 @ 07:56)  CT Abdomen and Pelvis w/ IV Cont:   EXAM:  CT ABDOMEN AND PELVIS IC            PROCEDURE DATE:  2021            INTERPRETATION:  CLINICAL STATEMENT: Abdominal pain. Status post  with purulent drainage from incision site.    TECHNIQUE: Contiguous axial CT images were obtained from the lower chest to the pubic symphysis following administration of 95 cc Omnipaque 350 intravenous contrast.  Oral contrast was not administered.  Reformatted images in the coronal and sagittal planes were acquired.    COMPARISON CT: None.    FINDINGS:    LOWER CHEST: Unremarkable.    HEPATOBILIARY: The hepatic parenchyma is unremarkable. No intrahepatic or extra hepatic biliary ductal dilatation. The gallbladder is present.    SPLEEN: Unremarkable.    PANCREAS: Unremarkable.    ADRENAL GLANDS: Unremarkable.    KIDNEYS: Symmetric nephrogram. No hydronephrosis or obstructing renal calculus.    ABDOMINOPELVIC NODES: Prominent inguinal lymph nodes, likely reactive.    PELVIC ORGANS: 4.8 x 1.4 x 3.6 cm rim enhancing fluid collection extending from the endometrial cavity into the ventral abdominopelvic soft tissues. Gas is noted in the vaginal canal.    PERITONEUM/MESENTERY/BOWEL: No evidence of bowel obstruction, pneumoperitoneum, or ascites.    BONES/SOFT TISSUES: Soft tissue gas and inflammatory change at the ventral abdominopelvic incision site. Diastases of the anterior abdominal wall.    OTHER: Normal caliber aorta.      IMPRESSION:    4.8 cm abscess extending from the endometrial cavity into the ventral abdominopelvic soft tissues at the patient's  incision site with surrounding inflammatory change and foci of soft tissue gas.      Dr. Gigi Ferrer discussed preliminary findings with ELISA GOLDMAN MD on 2021 8:27 PM with readback.    --- End of Report ---            GIGI FERRER MD; Resident Radiologist  This document has been electronically signed.  EMRE BERNARD MD; Attending Radiologist  This document has been electronically signed. Aug  6 2021  8:51PM (21 @ 19:29)      CARDIOLOGY TESTING      All available historical records have been reviewed    MEDICATIONS  lactated ringers. 1000  piperacillin/tazobactam IVPB.. 3.375  vancomycin  IVPB   vancomycin  IVPB 1250      ANTIBIOTICS:  piperacillin/tazobactam IVPB.. 3.375 Gram(s) IV Intermittent every 8 hours  vancomycin  IVPB      vancomycin  IVPB 1250 milliGRAM(s) IV Intermittent every 12 hours      All available historical data has been reviewed

## 2021-08-09 NOTE — CHART NOTE - NSCHARTNOTEFT_GEN_A_CORE
PGY 3 Note    Patient seen and evaluated at bedside, doing well, no acute complaints. Discussed management options with patient at length. Offered surgical management on 8/10 with exploratory laparotomy, debridement of tissue and re-closure of all layers. Risks discussed including persistent infection, bleeding, and injury to surrounding organs, and possible benefit of better pregnancy outcomes in the future. Also offered conservative management with antibiotic treatment and daily packing changes for at least two weeks. All questions answered. Patient declined surgery at this time and prefers to be NY'ed with home antibiotics and VNS.     Dr. Merrill at bedside. PGY 3 Note    Patient seen and evaluated at bedside, doing well, no acute complaints. Discussed management options with patient at length. Offered surgical management on 8/10 with exploratory laparotomy, debridement of tissue and re-closure of all abdominal layers. Risks discussed including persistent infection, bleeding, and injury to surrounding organs, and possible benefit of better pregnancy outcomes in the future. Also offered conservative management with antibiotic treatment and daily packing changes for at least two weeks. All questions answered. Patient declined surgery at this time and prefers to be AR'ed with home antibiotics and VNS.     Dr. Merrill at bedside.

## 2021-08-09 NOTE — DISCHARGE NOTE PROVIDER - NSDCFUADDINST_GEN_ALL_CORE_FT
Take augmentin 1 pill twice daily for two weeks. Follow up with Dr. Carbajal in 1 week on 8/16. If you have fevers, worsening incisional pain, N/V, or heavy vaginal bleeding, call your doctor or return to the emergency room.

## 2021-08-09 NOTE — CONSULT NOTE ADULT - ASSESSMENT
ASSESSMENT  34yo  Female with a PMH of fibroids and repeat  on  presented with discharge from incision site. Patient appears comfortable and clinically stable. Blood cultures are negative as of . Abscess size of 4.4cm per CT is unchanged after 3 days of IV antibiotics.     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    -Cannot start PO antibiotics as abscess is >2cm      RECOMMENDATIONS  - f/u pending cultures  - Antibiotics: Continue Vancomycin 1250mg q12 and Zosyn 3.375g q8  - CRP  - New vanc trough level  - MRSA nares swab  - Drainage of abscess by Ob/gyn ex lap or IR procedure is indicated, abscess size has not changed on antibiotic therapy  - If drainage is refused, patient will need a mid line placed to continue IV antibiotics at home. ertapenem 1g q24h IV x 21 days

## 2021-08-09 NOTE — PROGRESS NOTE ADULT - SUBJECTIVE AND OBJECTIVE BOX
Chief Complaint: wound infection    HPI: Pt seen and evaluated at bedside, doing well this morning, reports pain is well controlled with PO meds. Reports less drainage from incision site. Denies fever, chills, SOB, chest pain, n/v, abdominal pain or dysuria.     ROS: Denies cardiovascular or respiratory symptoms    PAST MEDICAL & SURGICAL HISTORY:  No pertinent past medical history    History of  section    H/O myomectomy        Physical Exam  Vital Signs Last 24 Hrs  T(F): 96.8 (09 Aug 2021 05:03), Max: 97.9 (08 Aug 2021 08:44)  HR: 64 (09 Aug 2021 05:03) (61 - 96)  BP: 99/60 (09 Aug 2021 05:03) (98/53 - 119/64)  RR: 18 (09 Aug 2021 05:03) (18 - 20)    Physical exam:  General - AAOx3, NAD  Heart - S1S2, RRR  Lungs - CTA BL  Abdomen:  - Soft, nontender, nondistended, BS+  - pfannenstiel skin incision with 2 cm opening on left edge, packing in place, minimal drainage noted, surrounding erythema decreased to near incision site only. Packing removed at this time for imaging  Pelvis/Vagina - No bleeding  Extremities - No calf tenderness, no swelling    Labs:                        12.2   7.08  )-----------( 526      ( 08 Aug 2021 07:09 )             37.6                         10.8   8.06  )-----------( 500      ( 07 Aug 2021 23:38 )             33.2     136  |  101  |  11  ----------------------------<101  4.6  |  23  |  0.8    Magnesium, Serum: 2.0 mg/dL (21 @ 07:09)    Phosphorus Level, Serum: 3.9 mg/dL (21 @ 07:09)      Antibody Screen: NEG (21 @ 21:18)

## 2021-08-09 NOTE — DISCHARGE NOTE PROVIDER - CARE PROVIDERS DIRECT ADDRESSES
[email or web address]

## 2021-08-09 NOTE — DISCHARGE NOTE PROVIDER - NSDCMRMEDTOKEN_GEN_ALL_CORE_FT
acetaminophen 325 mg oral tablet: 2 tab(s) orally every 6 hours, As needed, Temp greater or equal to 38C (100.4F), Moderate Pain (4 - 6)  Augmentin 875 mg-125 mg oral tablet: 1 tab(s) orally every 12 hours MDD:2 tabs  ibuprofen 600 mg oral tablet: 1 tab(s) orally every 6 hours, As needed, Moderate Pain (4 - 6)   acetaminophen 325 mg oral tablet: 2 tab(s) orally every 6 hours, As needed, Temp greater or equal to 38C (100.4F), Moderate Pain (4 - 6)  Colace 100 mg oral capsule: 1 cap(s) orally 2 times a day MDD:2 tabs  ertapenem 1 g injection: 1 gram(s) intravenous once a day  ibuprofen 600 mg oral tablet: 1 tab(s) orally every 6 hours, As needed, Moderate Pain (4 - 6)  simethicone 80 mg oral tablet: 1 tab(s) orally 3 times a day (after meals) MDD:3 tabs

## 2021-08-09 NOTE — DISCHARGE NOTE PROVIDER - NSDCCPCAREPLAN_GEN_ALL_CORE_FT
PRINCIPAL DISCHARGE DIAGNOSIS  Diagnosis: Intra-abdominal abscess  Assessment and Plan of Treatment:

## 2021-08-09 NOTE — CONSULT NOTE ADULT - ATTENDING COMMENTS
I have personally seen and examined this patient.  I have fully participated in the care of this patient.  I have reviewed all pertinent clinical information, including history, physical exam, plan and note.  I have reviewed all pertinent clinical information and reviewed all relevant imaging and diagnostic studies personally.  Corrections and edits were made wherever needed.       Admitted with uterocutaneous fistula and 4cm abscess, no change on repeat CT, on vanc/zosyn  Likely source control issue  No signs of sepsis  Pt declining surgery  Per GYN , IR was consulted- but no plan for intervention    - MRSA nares  - ESR, CRP  - likely midline x ertapenem 1g q24h IV 21 days  - Repeat CTAP as outpatient to determine final course   - Weekly CBC, CMP, ESR/CRP  - ID follow-up with Dr. Sebastien Machuca for Telehealth. We will call the patient between 10:30-6:30      [address]        [phone]       Fax [phone]

## 2021-08-09 NOTE — DISCHARGE NOTE PROVIDER - NSFOLLOWUPCLINICS_GEN_ALL_ED_FT
[facility]  OB/GYN  [address] [place] [address]  Phone: [phone]  Fax:

## 2021-08-09 NOTE — DISCHARGE NOTE PROVIDER - HOSPITAL COURSE
Patient admitted for wound infection s/p repeat  section, received course of IV antibiotics and was discharged home on HD#4 with VNS for packing changes and oral antibiotics. Patient admitted for wound infection s/p repeat  section, received course of IV antibiotics, midline was placed, and was discharged home on HD#4 with VNS for packing changes and IV antibiotics.

## 2021-08-09 NOTE — DISCHARGE NOTE PROVIDER - CARE PROVIDER_API CALL
Jagdish Merrill)  Obstetrics and Gynecology  [address] [place] [address]  Phone: [phone]  Fax: [phone]  Follow Up Time: 1 week   Katya Carbajal)  Obstetrics and Gynecology  [address] [place] [address]  Phone: [phone]  Fax: [phone]  Scheduled Appointment: 08/16/2021

## 2021-08-09 NOTE — DISCHARGE NOTE PROVIDER - PROVIDER TOKENS
PROVIDER:[TOKEN:[[id number]],FOLLOWUP:[1 week]] PROVIDER:[TOKEN:[[id number]],SCHEDULEDAPPT:[08/16/2021]]

## 2021-08-10 ENCOUNTER — TRANSCRIPTION ENCOUNTER (OUTPATIENT)
Age: 36
End: 2021-08-10

## 2021-08-10 VITALS
RESPIRATION RATE: 20 BRPM | SYSTOLIC BLOOD PRESSURE: 102 MMHG | TEMPERATURE: 98 F | HEART RATE: 76 BPM | DIASTOLIC BLOOD PRESSURE: 62 MMHG | OXYGEN SATURATION: 99 %

## 2021-08-10 LAB
ALBUMIN SERPL ELPH-MCNC: 3.4 G/DL — LOW (ref 3.5–5.2)
ALP SERPL-CCNC: 93 U/L — SIGNIFICANT CHANGE UP (ref 30–115)
ALT FLD-CCNC: 6 U/L — SIGNIFICANT CHANGE UP (ref 0–41)
ANION GAP SERPL CALC-SCNC: 12 MMOL/L — SIGNIFICANT CHANGE UP (ref 7–14)
AST SERPL-CCNC: 7 U/L — SIGNIFICANT CHANGE UP (ref 0–41)
BACTERIA SPEC CULT: NORMAL
BASOPHILS # BLD AUTO: 0.05 K/UL — SIGNIFICANT CHANGE UP (ref 0–0.2)
BASOPHILS NFR BLD AUTO: 0.7 % — SIGNIFICANT CHANGE UP (ref 0–1)
BILIRUB SERPL-MCNC: 0.2 MG/DL — SIGNIFICANT CHANGE UP (ref 0.2–1.2)
BLD GP AB SCN SERPL QL: SIGNIFICANT CHANGE UP
BUN SERPL-MCNC: 17 MG/DL — SIGNIFICANT CHANGE UP (ref 10–20)
CALCIUM SERPL-MCNC: 9.1 MG/DL — SIGNIFICANT CHANGE UP (ref 8.5–10.1)
CHLORIDE SERPL-SCNC: 104 MMOL/L — SIGNIFICANT CHANGE UP (ref 98–110)
CO2 SERPL-SCNC: 23 MMOL/L — SIGNIFICANT CHANGE UP (ref 17–32)
CREAT SERPL-MCNC: 0.9 MG/DL — SIGNIFICANT CHANGE UP (ref 0.7–1.5)
CRP SERPL-MCNC: 5 MG/L — HIGH
EOSINOPHIL # BLD AUTO: 0.24 K/UL — SIGNIFICANT CHANGE UP (ref 0–0.7)
EOSINOPHIL NFR BLD AUTO: 3.4 % — SIGNIFICANT CHANGE UP (ref 0–8)
ERYTHROCYTE [SEDIMENTATION RATE] IN BLOOD: 32 MM/HR — HIGH (ref 0–20)
GLUCOSE SERPL-MCNC: 123 MG/DL — HIGH (ref 70–99)
HCT VFR BLD CALC: 35.2 % — LOW (ref 37–47)
HGB BLD-MCNC: 11.3 G/DL — LOW (ref 12–16)
IMM GRANULOCYTES NFR BLD AUTO: 0.9 % — HIGH (ref 0.1–0.3)
LYMPHOCYTES # BLD AUTO: 1.64 K/UL — SIGNIFICANT CHANGE UP (ref 1.2–3.4)
LYMPHOCYTES # BLD AUTO: 23.5 % — SIGNIFICANT CHANGE UP (ref 20.5–51.1)
MAGNESIUM SERPL-MCNC: 2.1 MG/DL — SIGNIFICANT CHANGE UP (ref 1.8–2.4)
MCHC RBC-ENTMCNC: 27.8 PG — SIGNIFICANT CHANGE UP (ref 27–31)
MCHC RBC-ENTMCNC: 32.1 G/DL — SIGNIFICANT CHANGE UP (ref 32–37)
MCV RBC AUTO: 86.7 FL — SIGNIFICANT CHANGE UP (ref 81–99)
MONOCYTES # BLD AUTO: 0.59 K/UL — SIGNIFICANT CHANGE UP (ref 0.1–0.6)
MONOCYTES NFR BLD AUTO: 8.5 % — SIGNIFICANT CHANGE UP (ref 1.7–9.3)
NEUTROPHILS # BLD AUTO: 4.4 K/UL — SIGNIFICANT CHANGE UP (ref 1.4–6.5)
NEUTROPHILS NFR BLD AUTO: 63 % — SIGNIFICANT CHANGE UP (ref 42.2–75.2)
NRBC # BLD: 0 /100 WBCS — SIGNIFICANT CHANGE UP (ref 0–0)
PHOSPHATE SERPL-MCNC: 3.7 MG/DL — SIGNIFICANT CHANGE UP (ref 2.1–4.9)
PLATELET # BLD AUTO: 451 K/UL — HIGH (ref 130–400)
POTASSIUM SERPL-MCNC: 4.3 MMOL/L — SIGNIFICANT CHANGE UP (ref 3.5–5)
POTASSIUM SERPL-SCNC: 4.3 MMOL/L — SIGNIFICANT CHANGE UP (ref 3.5–5)
PROT SERPL-MCNC: 6 G/DL — SIGNIFICANT CHANGE UP (ref 6–8)
RBC # BLD: 4.06 M/UL — LOW (ref 4.2–5.4)
RBC # FLD: 13.2 % — SIGNIFICANT CHANGE UP (ref 11.5–14.5)
SODIUM SERPL-SCNC: 139 MMOL/L — SIGNIFICANT CHANGE UP (ref 135–146)
WBC # BLD: 6.98 K/UL — SIGNIFICANT CHANGE UP (ref 4.8–10.8)
WBC # FLD AUTO: 6.98 K/UL — SIGNIFICANT CHANGE UP (ref 4.8–10.8)

## 2021-08-10 PROCEDURE — 36569 INSJ PICC 5 YR+ W/O IMAGING: CPT

## 2021-08-10 PROCEDURE — 99238 HOSP IP/OBS DSCHRG MGMT 30/<: CPT

## 2021-08-10 PROCEDURE — 76937 US GUIDE VASCULAR ACCESS: CPT | Mod: 26,59

## 2021-08-10 RX ORDER — ERTAPENEM SODIUM 1 G/1
1000 INJECTION, POWDER, LYOPHILIZED, FOR SOLUTION INTRAMUSCULAR; INTRAVENOUS EVERY 24 HOURS
Refills: 0 | Status: DISCONTINUED | OUTPATIENT
Start: 2021-08-11 | End: 2021-08-10

## 2021-08-10 RX ORDER — ERTAPENEM SODIUM 1 G/1
1000 INJECTION, POWDER, LYOPHILIZED, FOR SOLUTION INTRAMUSCULAR; INTRAVENOUS ONCE
Refills: 0 | Status: COMPLETED | OUTPATIENT
Start: 2021-08-10 | End: 2021-08-10

## 2021-08-10 RX ORDER — SIMETHICONE 80 MG/1
1 TABLET, CHEWABLE ORAL
Qty: 21 | Refills: 0
Start: 2021-08-10 | End: 2021-08-16

## 2021-08-10 RX ORDER — ERTAPENEM SODIUM 1 G/1
1 INJECTION, POWDER, LYOPHILIZED, FOR SOLUTION INTRAMUSCULAR; INTRAVENOUS
Qty: 0 | Refills: 0 | DISCHARGE
Start: 2021-08-10

## 2021-08-10 RX ORDER — ERTAPENEM SODIUM 1 G/1
INJECTION, POWDER, LYOPHILIZED, FOR SOLUTION INTRAMUSCULAR; INTRAVENOUS
Refills: 0 | Status: DISCONTINUED | OUTPATIENT
Start: 2021-08-10 | End: 2021-08-10

## 2021-08-10 RX ORDER — DOCUSATE SODIUM 100 MG
1 CAPSULE ORAL
Qty: 14 | Refills: 0
Start: 2021-08-10 | End: 2021-08-16

## 2021-08-10 RX ADMIN — Medication 166.67 MILLIGRAM(S): at 05:08

## 2021-08-10 RX ADMIN — PIPERACILLIN AND TAZOBACTAM 25 GRAM(S): 4; .5 INJECTION, POWDER, LYOPHILIZED, FOR SOLUTION INTRAVENOUS at 05:08

## 2021-08-10 RX ADMIN — ERTAPENEM SODIUM 120 MILLIGRAM(S): 1 INJECTION, POWDER, LYOPHILIZED, FOR SOLUTION INTRAMUSCULAR; INTRAVENOUS at 10:35

## 2021-08-10 NOTE — CDI QUERY NOTE - NSCDIOTHERTXTBX_GEN_ALL_CORE_HH
Documentation  6/6 HPI: 35F s/p c section admitted for Intra-abdominal abscess , infected wound site, utero cutaneous fistula   6/8 –GYN Attending: Lactate up-trending  Laboratory Findings  Lactate, Blood: 1.0 mmol/L (08-09-21 @ 05:51)  Lactate, Blood: 2.3 mmol/L (08-08-21 @ 07:09)  Lactate, Blood: 1.2 mmol/L (08-07-21 @ 23:38)  Lactate, Blood: 0.6 mmol/L (08-06-21 @ 13:00)    IVF  8/6 LR 1L bolus  8/9 LR @ 125ml/hhr  Based on your professional judgment and above clinical findings please further clarify  documented lactate up trending as  -Lactic Acidosis  -Other please specify  -Clinically not significant

## 2021-08-10 NOTE — PROGRESS NOTE ADULT - ASSESSMENT
ASSESSMENT  34yo  Female with a PMH of fibroids and repeat  on  presented with discharge from incision site. Patient appears comfortable and clinically stable. Blood cultures are negative as of . Abscess size of 4.4cm per CT is unchanged after 3 days of IV antibiotics.     IMPRESSION  #Admitted with uterocutaneous fistula and 4cm abscess, no change on repeat CT, on vanc/zosyn    - 4.4 cm rim-enhancing fluid collection from the endometrial cavity to anterior pelvic soft tissue    - Size has remained stable from  to .  Likely source control issue  No signs of sepsis  Pt declining surgery  Per GYN , IR was consulted- but no plan for intervention    RECOMMED  - MRSA nares If + add PO doxy 100mg BID  - ESR, CRP  -  midline x ertapenem 1g q24h IV 21 days, safe for breast feeding  - Repeat CTAP as outpatient to determine final course   - Weekly CBC, CMP, ESR/CRP  - ID follow-up with Dr. Sebastien Rochadays  for Telehealth. We will call the patient between 10:30-6:30      [address]       [phone]       Fax [phone].

## 2021-08-10 NOTE — PROGRESS NOTE ADULT - SUBJECTIVE AND OBJECTIVE BOX
AUDREY BANERJEE  35y, Female  Allergy: No Known Allergies      LOS  4d    CHIEF COMPLAINT: wound infection (10 Aug 2021 06:19)      INTERVAL EVENTS/HPI  - No acute events overnight  - T(F): , Max: 98.5 (08-09-21 @ 21:50)  - Denies any worsening symptoms  - Tolerating medication  - WBC Count: 6.98 (08-10-21 @ 05:31)  WBC Count: 7.09 (08-09-21 @ 05:51)     - Creatinine, Serum: 0.9 (08-10-21 @ 05:31)  Creatinine, Serum: 0.8 (08-09-21 @ 05:51)       ROS  General: Denies rigors, nightsweats  HEENT: Denies headache, rhinorrhea, sore throat, eye pain  CV: Denies CP, palpitations  PULM: Denies wheezing, hemoptysis  GI: Denies hematemesis, hematochezia, melena  : Denies discharge, hematuria  MSK: Denies arthralgias, myalgias  SKIN: Denies rash, lesions  NEURO: Denies paresthesias, weakness  PSYCH: Denies depression, anxiety    VITALS:  T(F): 98.5, Max: 98.5 (08-09-21 @ 21:50)  HR: 94  BP: 102/63  RR: 20Vital Signs Last 24 Hrs  T(C): 36.9 (10 Aug 2021 09:34), Max: 36.9 (09 Aug 2021 13:16)  T(F): 98.5 (10 Aug 2021 09:34), Max: 98.5 (09 Aug 2021 21:50)  HR: 94 (10 Aug 2021 09:34) (66 - 97)  BP: 102/63 (10 Aug 2021 09:34) (100/88 - 109/57)  BP(mean): --  RR: 20 (10 Aug 2021 09:34) (18 - 20)  SpO2: 99% (10 Aug 2021 09:34) (95% - 100%)    PHYSICAL EXAM:  Gen: NAD, resting in bed  HEENT: Normocephalic, atraumatic  Neck: supple, no lymphadenopathy  CV: Regular rate & regular rhythm  Lungs: decreased BS at bases, no fremitus  Abdomen: Soft, BS present, dressing  Ext: Warm, well perfused  Neuro: non focal, awake  Skin: no rash, no erythema  Lines: no phlebitis    FH: Non-contributory  Social Hx: Non-contributory    TESTS & MEASUREMENTS:                        11.3   6.98  )-----------( 451      ( 10 Aug 2021 05:31 )             35.2     08-10    139  |  104  |  17  ----------------------------<  123<H>  4.3   |  23  |  0.9    Ca    9.1      10 Aug 2021 05:31  Phos  3.7     08-10  Mg     2.1     08-10    TPro  6.0  /  Alb  3.4<L>  /  TBili  0.2  /  DBili  x   /  AST  7   /  ALT  6   /  AlkPhos  93  08-10    eGFR if Non African American: 83 mL/min/1.73M2 (08-10-21 @ 05:31)  eGFR if African American: 97 mL/min/1.73M2 (08-10-21 @ 05:31)    LIVER FUNCTIONS - ( 10 Aug 2021 05:31 )  Alb: 3.4 g/dL / Pro: 6.0 g/dL / ALK PHOS: 93 U/L / ALT: 6 U/L / AST: 7 U/L / GGT: x               Culture - Blood (collected 08-06-21 @ 21:17)  Source: .Blood Blood  Preliminary Report (08-08-21 @ 08:01):    No growth to date.    Culture - Blood (collected 08-06-21 @ 21:17)  Source: .Blood Blood  Preliminary Report (08-08-21 @ 08:01):    No growth to date.        Lactate, Blood: 1.0 mmol/L (08-09-21 @ 05:51)  Lactate, Blood: 2.3 mmol/L (08-08-21 @ 07:09)  Lactate, Blood: 1.2 mmol/L (08-07-21 @ 23:38)  Lactate, Blood: 0.6 mmol/L (08-06-21 @ 13:00)      INFECTIOUS DISEASES TESTING  Vancomycin Level, Trough: 17.0 (08-09-21 @ 17:01)  Vancomycin Level, Trough: 10.9 (08-08-21 @ 09:50)  COVID-19 PCR: NotDetec (08-06-21 @ 21:43)      INFLAMMATORY MARKERS  Sedimentation Rate, Erythrocyte: 32 mm/Hr (08-10-21 @ 05:31)  Sedimentation Rate, Erythrocyte: 43 mm/Hr (08-08-21 @ 07:09)  C-Reactive Protein, Serum: 13 mg/L (08-08-21 @ 07:09)      RADIOLOGY & ADDITIONAL TESTS:  I have personally reviewed the last available Chest xray  CXR      CT  CT Abdomen and Pelvis w/ IV Cont:   EXAM:  CT ABDOMEN AND PELVIS IC            PROCEDURE DATE:  08/09/2021            INTERPRETATION:  CLINICAL STATEMENT: Follow-up uterocutaneous fistula.    TECHNIQUE: Contiguous axial CT images were obtained from the lower chest to the pubic symphysis following administration of 100cc Optiray 320 intravenous contrast.  Oral contrast was not administered.  Reformatted images in the coronal and sagittal planes were acquired.    COMPARISON CT: August 6, 2021.    FINDINGS:    LOWER CHEST: Unchanged.    HEPATOBILIARY: Unchanged.    SPLEEN: Unchanged..    PANCREAS: Unchanged.    ADRENAL GLANDS: Unchanged.    KIDNEYS: Unchanged.    ABDOMINOPELVIC NODES: Unchanged.    PELVIC ORGANS: No significant change in a 4.4 x 1.3 x 3.6 cm rim-enhancing fluid collection extending from the endometrial cavity to the anterior pelvic soft tissues, with several internal punctate foci of gas (7/40.) Foci of gas are redemonstrated within the vaginal canal.    PERITONEUM/MESENTERY/BOWEL: Unchanged.    BONES/SOFT TISSUES: Unchanged soft tissue air and inflammatory changes along the Pfannenstiel incision site.      IMPRESSION:    1. Since August 6, 2021, overall stable exam, with no significant change in a 4.4 cm rim-enhancing fluid collection extending from theendometrial cavity to the anterior pelvic soft tissues, with several internal punctate foci of gas.    --- End of Report ---              PATRIZIA HENSLEY MD; Attending Radiologist  This document has been electronically signed. Aug  9 2021  8:16AM (08-09-21 @ 07:56)      CARDIOLOGY TESTING      MEDICATIONS  ertapenem  IVPB         WEIGHT  Weight (kg): 61.7 (08-06-21 @ 12:05)  Creatinine, Serum: 0.9 mg/dL (08-10-21 @ 05:31)      ANTIBIOTICS:  ertapenem  IVPB          All available historical records have been reviewed

## 2021-08-10 NOTE — PROGRESS NOTE ADULT - ATTENDING COMMENTS
POD21 s/p repeat c/s now with uterocutaneous fistula vs pelvic abscess, now s/p midline placement. Plan for home wound care and iv antibiotics x 3 weeks as per id recommendation. follow up cultures. For probable d/c home today
Patient seen and examined at bedside in NAD currently clinically and hemodynamically stable. Pain well controlled at this time. Incision examined and probed, 8cm deep and tracking to right 8cm. Packing changed w/ minimal serosanguinous fluid. Patients imaging discussed w/ radiology attending, per radiology appears to have uterocutaneous fistula. Plan for 48 hours of antibiotics and packing changes and re-imaging on 8/9. Discussed w/ patient possibility of surgery pending repeat CT results. Patient aware of plan and expressed understanding.
pt seen and examined  POD20 s/p repeat c/s w/ wound separation, possible communication between uterus-skin  pt appears clinically well, no s/sx infection, labs/vs normal  will f/u CT today and eval surgical vs conservative management, and plan for d/c w/ abx if no surgery

## 2021-08-10 NOTE — PROCEDURE NOTE - ADDITIONAL PROCEDURE DETAILS
Midline placed under US guidance and sterile technique. No complications. Midline flushed and aspirated with ease. Clear blood return on aspiration, no evidence of infiltration after 20cc of sterile saline injected. No pain.

## 2021-08-10 NOTE — DISCHARGE NOTE NURSING/CASE MANAGEMENT/SOCIAL WORK - NSSCCONTNUM_GEN_ALL_CORE
[phone] / [phone]  / [phone]   / [phone]   / [phone]

## 2021-08-10 NOTE — DISCHARGE NOTE NURSING/CASE MANAGEMENT/SOCIAL WORK - NSSCNAMETXT_GEN_ALL_CORE
[facility] /[facility]  [facility] / [facility]

## 2021-08-10 NOTE — PROGRESS NOTE ADULT - SUBJECTIVE AND OBJECTIVE BOX
Chief Complaint: wound infection    HPI: Pt seen and evaluated at bedside, doing well this morning. Reports minimal pain, she is ambulating and voiding without diffifulty. Denies fever, chills, SOB, chest pain, abdominal pain, dysuria, heavy vaginal bleeding or lower extremity pain.     ROS: Denies cardiovascular or respiratory symptoms    PAST MEDICAL & SURGICAL HISTORY:  No pertinent past medical history    History of  section    H/O myomectomy        Physical Exam  Vital Signs Last 24 Hrs  T(F): 96.9 (10 Aug 2021 04:59), Max: 98.5 (09 Aug 2021 21:50)  HR: 66 (10 Aug 2021 04:59) (66 - 97)  BP: 109/55 (10 Aug 2021 04:59) (100/88 - 123/68)  RR: 18 (10 Aug 2021 04:59) (18 - 20)    Physical exam:  General - AAOx3, NAD  Heart - S1S2, RRR  Lungs - CTA BL  Abdomen:  - Soft, nontender, nondistended, BS+  - pfannenstiel skin incision with packing (replaced), tracking 6cm to the right and 8cm deep, nontender to touch, no surrounding erythema  Pelvis/Vagina - minimal  Extremities - No calf tenderness, no swelling    Labs:                        11.2   7.09  )-----------( 436      ( 09 Aug 2021 05:51 )             34.5                         12.2   7.08  )-----------( 526      ( 08 Aug 2021 07:09 )             37.6             Antibody Screen: NEG (21 @ 21:18)    < from: CT Abdomen and Pelvis w/ IV Cont (21 @ 07:56) >  PROCEDURE DATE:  2021            INTERPRETATION:  CLINICAL STATEMENT: Follow-up uterocutaneous fistula.    TECHNIQUE: Contiguous axial CT images were obtained from the lower chest to the pubic symphysis following administration of 100cc Optiray 320 intravenous contrast.  Oral contrast was not administered.  Reformatted images in the coronal and sagittal planes were acquired.    COMPARISON CT: 2021.    FINDINGS:  LOWER CHEST: Unchanged.  HEPATOBILIARY: Unchanged.  SPLEEN: Unchanged..  PANCREAS: Unchanged.  ADRENAL GLANDS: Unchanged.  KIDNEYS: Unchanged.  ABDOMINOPELVIC NODES: Unchanged.  PELVIC ORGANS: No significant change in a 4.4 x 1.3 x 3.6 cm rim-enhancing fluid collection extending from the endometrial cavity to the anterior pelvic soft tissues, with several internal punctate foci of gas (7/40.) Foci of gas are redemonstrated within the vaginal canal.  PERITONEUM/MESENTERY/BOWEL: Unchanged.  BONES/SOFT TISSUES: Unchanged soft tissue air and inflammatory changes along the Pfannenstiel incision site.    IMPRESSION:  1. Since 2021, overall stable exam, with no significant change in a 4.4 cm rim-enhancing fluid collection extending from theendometrial cavity to the anterior pelvic soft tissues, with several internal punctate foci of gas.

## 2021-08-10 NOTE — PROGRESS NOTE ADULT - ASSESSMENT
34 y/o P2 s/p repeat c/s on 7/20, POD#21, with likely utero-cutaneous fistula versus a 4.8 cm abscess, currently clinically and hemodynamically stable  - CT scan showed no significant change from friday  - patient opting for conservative management with IV antibiotics for 2 weeks and daily packing change at this time  - s/p ID consult: continue zosyn + vancomycin in house, discharge with IV ertapenem 1g qd for 21days  - VNS for packing change and IV abx ordered  - Medicine consult for midline placement  - diet: reg diet  - DVT ppx: SCDs  - monitor temps and vitals  - pain management prn   - f/u bcx x2  - f/u MRSA swab  - packing change daily  - daily AM labs  - continue IVF hydration   - activity: ambulate as tolerated  - encourage ambulation  - encourage PO hydration       Dr. Fernández and OBGYN attending to be made aware

## 2021-08-10 NOTE — DISCHARGE NOTE NURSING/CASE MANAGEMENT/SOCIAL WORK - PATIENT PORTAL LINK FT
You can access the FollowMyHealth Patient Portal offered by [facility] by registering at the following website: [email or web address]. By joining [facility]’s FollowMyHealth portal, you will also be able to view your health information using other applications (apps) compatible with our system.

## 2021-08-12 LAB
CULTURE RESULTS: SIGNIFICANT CHANGE UP
CULTURE RESULTS: SIGNIFICANT CHANGE UP
SPECIMEN SOURCE: SIGNIFICANT CHANGE UP
SPECIMEN SOURCE: SIGNIFICANT CHANGE UP

## 2021-08-16 ENCOUNTER — OUTPATIENT (OUTPATIENT)
Dept: OUTPATIENT SERVICES | Facility: HOSPITAL | Age: 36
LOS: 1 days | Discharge: HOME | End: 2021-08-16

## 2021-08-16 ENCOUNTER — APPOINTMENT (OUTPATIENT)
Dept: OBGYN | Facility: CLINIC | Age: 36
End: 2021-08-16
Payer: MEDICAID

## 2021-08-16 VITALS
DIASTOLIC BLOOD PRESSURE: 66 MMHG | TEMPERATURE: 98.3 F | BODY MASS INDEX: 24.28 KG/M2 | SYSTOLIC BLOOD PRESSURE: 112 MMHG | WEIGHT: 137 LBS

## 2021-08-16 DIAGNOSIS — Z98.891 HISTORY OF UTERINE SCAR FROM PREVIOUS SURGERY: Chronic | ICD-10-CM

## 2021-08-16 DIAGNOSIS — Z98.890 OTHER SPECIFIED POSTPROCEDURAL STATES: Chronic | ICD-10-CM

## 2021-08-16 PROCEDURE — 99213 OFFICE O/P EST LOW 20 MIN: CPT

## 2021-08-16 NOTE — HISTORY OF PRESENT ILLNESS
[Postpartum Follow Up] : postpartum follow up [None] : The patient is currently asymptomatic [FreeTextEntry8] : Pt here today for evaluation of wound dehiscence. Pt getting iv ertepenem and daily wound packing. Wound culture and blood cultures all negative [de-identified] : pumping [de-identified] : see incision diagram [de-identified] : Wound surveillance [de-identified] : Pt to complete abx. Informed that she can breast feed on ertepenem. Pt to rv in 1 week for wound evaluation

## 2021-08-23 ENCOUNTER — APPOINTMENT (OUTPATIENT)
Dept: OBGYN | Facility: CLINIC | Age: 36
End: 2021-08-23
Payer: MEDICAID

## 2021-08-23 ENCOUNTER — OUTPATIENT (OUTPATIENT)
Dept: OUTPATIENT SERVICES | Facility: HOSPITAL | Age: 36
LOS: 1 days | Discharge: HOME | End: 2021-08-23

## 2021-08-23 VITALS
WEIGHT: 138 LBS | DIASTOLIC BLOOD PRESSURE: 64 MMHG | TEMPERATURE: 98.3 F | BODY MASS INDEX: 25.4 KG/M2 | HEIGHT: 62 IN | SYSTOLIC BLOOD PRESSURE: 100 MMHG

## 2021-08-23 DIAGNOSIS — Z98.891 HISTORY OF UTERINE SCAR FROM PREVIOUS SURGERY: Chronic | ICD-10-CM

## 2021-08-23 DIAGNOSIS — Z98.891 HISTORY OF UTERINE SCAR FROM PREVIOUS SURGERY: ICD-10-CM

## 2021-08-23 DIAGNOSIS — Z98.890 OTHER SPECIFIED POSTPROCEDURAL STATES: Chronic | ICD-10-CM

## 2021-08-23 DIAGNOSIS — O09.30 SUPERVISION OF PREGNANCY WITH INSUFFICIENT ANTENATAL CARE, UNSPECIFIED TRIMESTER: ICD-10-CM

## 2021-08-23 PROCEDURE — 99213 OFFICE O/P EST LOW 20 MIN: CPT

## 2021-08-23 RX ORDER — NORETHINDRONE ACETATE 5 MG/1
5 TABLET ORAL DAILY
Qty: 28 | Refills: 6 | Status: ACTIVE | COMMUNITY
Start: 2021-08-23 | End: 1900-01-01

## 2021-08-26 DIAGNOSIS — T81.30XA DISRUPTION OF WOUND, UNSPECIFIED, INITIAL ENCOUNTER: ICD-10-CM

## 2021-09-01 ENCOUNTER — APPOINTMENT (OUTPATIENT)
Dept: OBGYN | Facility: CLINIC | Age: 36
End: 2021-09-01

## 2022-04-26 NOTE — ED ADULT TRIAGE NOTE - AS TEMP SITE
oral Simponi Counseling:  I discussed with the patient the risks of golimumab including but not limited to myelosuppression, immunosuppression, autoimmune hepatitis, demyelinating diseases, lymphoma, and serious infections.  The patient understands that monitoring is required including a PPD at baseline and must alert us or the primary physician if symptoms of infection or other concerning signs are noted.

## 2022-11-21 NOTE — DISCHARGE NOTE PROVIDER - NSDCHHNEEDSERVICE_GEN_ALL_CORE
-- DO NOT REPLY / DO NOT REPLY ALL --  -- Message is from Engagement Center Operations (ECO) --    General Patient Message : Patient would like to speak with DR Troy to see when she is due for her next appointment with her. A message can also be send through the [facility] cy.   Please follow up as soon as possible.      Caller Information       Type Contact Phone/Fax    11/21/2022 11:17 AM CST Phone (Incoming) Montserrat Ramirez (Self) [phone] (M)        Alternative phone number: none    Can a detailed message be left? Yes    Message Turnaround: [place]:    Refer to site's KB page for routing instructions    Please give this turnaround time to the caller:   \"You can expect to receive a response 1-3 business days after your provider's clinical team reviews the message\"               Other, specify...

## 2023-07-06 NOTE — OB PROVIDER TRIAGE NOTE - FAMILY HISTORY
Called the patient and let her know that I have her on the wait list for Rheumatology.    No pertinent family history in first degree relatives

## 2024-02-01 NOTE — PROVIDER CONTACT NOTE (OTHER) - SITUATION
Therapist spoke with Pt about rescheduling. Pt is scheduled to meet with therapist on Tuesday, February 6.    pt requesting to speak to MD, also requesting her abd dressing be changed.